# Patient Record
Sex: MALE | Race: WHITE | NOT HISPANIC OR LATINO | Employment: FULL TIME | URBAN - METROPOLITAN AREA
[De-identification: names, ages, dates, MRNs, and addresses within clinical notes are randomized per-mention and may not be internally consistent; named-entity substitution may affect disease eponyms.]

---

## 2017-08-15 ENCOUNTER — HOSPITAL ENCOUNTER (OUTPATIENT)
Dept: FAMILY MEDICINE CLINIC | Facility: CLINIC | Age: 52
Setting detail: SPECIMEN
Discharge: HOME | End: 2017-08-15
Attending: NURSE PRACTITIONER | Admitting: NURSE PRACTITIONER

## 2017-08-15 LAB
ALBUMIN SERPL-MCNC: 4.4 G/DL (ref 3.5–4.8)
ALBUMIN/GLOB SERPL: 1.4 {RATIO} (ref 1–1.7)
ALP SERPL-CCNC: 86 IU/L (ref 32–91)
ALT SERPL-CCNC: 36 IU/L (ref 17–63)
ANION GAP SERPL CALC-SCNC: 19.6 MMOL/L (ref 10–20)
AST SERPL-CCNC: 27 IU/L (ref 15–41)
BASOPHILS # BLD AUTO: 0 10*3/UL (ref 0–0.2)
BASOPHILS NFR BLD AUTO: 0 % (ref 0–2)
BILIRUB SERPL-MCNC: 0.5 MG/DL (ref 0.3–1.2)
BUN SERPL-MCNC: 12 MG/DL (ref 8–20)
BUN/CREAT SERPL: 15 (ref 6.2–20.3)
CALCIUM SERPL-MCNC: 9.9 MG/DL (ref 8.9–10.3)
CHLORIDE SERPL-SCNC: 102 MMOL/L (ref 101–111)
CHOLEST SERPL-MCNC: 235 MG/DL
CHOLEST/HDLC SERPL: 3.4 {RATIO}
CONV CO2: 23 MMOL/L (ref 22–32)
CONV LDL CHOLESTEROL DIRECT: 139 MG/DL (ref 0–100)
CONV TOTAL PROTEIN: 7.5 G/DL (ref 6.1–7.9)
CREAT UR-MCNC: 0.8 MG/DL (ref 0.7–1.2)
DIFFERENTIAL METHOD BLD: (no result)
EOSINOPHIL # BLD AUTO: 0.2 10*3/UL (ref 0–0.3)
EOSINOPHIL # BLD AUTO: 2 % (ref 0–3)
ERYTHROCYTE [DISTWIDTH] IN BLOOD BY AUTOMATED COUNT: 14 % (ref 11.5–14.5)
GLOBULIN UR ELPH-MCNC: 3.1 G/DL (ref 2.5–3.8)
GLUCOSE SERPL-MCNC: 87 MG/DL (ref 65–99)
HCT VFR BLD AUTO: 50.7 % (ref 40–54)
HDLC SERPL-MCNC: 69 MG/DL
HGB BLD-MCNC: 17.1 G/DL (ref 14–18)
LDLC/HDLC SERPL: 2 {RATIO}
LIPID INTERPRETATION: ABNORMAL
LYMPHOCYTES # BLD AUTO: 2.4 10*3/UL (ref 0.8–4.8)
LYMPHOCYTES NFR BLD AUTO: 23 % (ref 18–42)
MCH RBC QN AUTO: 32.4 PG (ref 26–32)
MCHC RBC AUTO-ENTMCNC: 33.8 G/DL (ref 32–36)
MCV RBC AUTO: 95.8 FL (ref 80–94)
MONOCYTES # BLD AUTO: 1 10*3/UL (ref 0.1–1.3)
MONOCYTES NFR BLD AUTO: 9 % (ref 2–11)
NEUTROPHILS # BLD AUTO: 6.7 10*3/UL (ref 2.3–8.6)
NEUTROPHILS NFR BLD AUTO: 66 % (ref 50–75)
NRBC BLD AUTO-RTO: 0 /100{WBCS}
NRBC/RBC NFR BLD MANUAL: 0 10*3/UL
PLATELET # BLD AUTO: 330 10*3/UL (ref 150–450)
PMV BLD AUTO: 7.4 FL (ref 7.4–10.4)
POTASSIUM SERPL-SCNC: 4.6 MMOL/L (ref 3.6–5.1)
PSA SERPL-MCNC: 2.17 NG/ML (ref 0–4)
RBC # BLD AUTO: 5.29 10*6/UL (ref 4.6–6)
SODIUM SERPL-SCNC: 140 MMOL/L (ref 136–144)
TRIGL SERPL-MCNC: 157 MG/DL
TSH SERPL-ACNC: 3.02 UIU/ML (ref 0.34–5.6)
VLDLC SERPL CALC-MCNC: 26.8 MG/DL
WBC # BLD AUTO: 10.2 10*3/UL (ref 4.5–11.5)

## 2017-09-07 ENCOUNTER — HOSPITAL ENCOUNTER (OUTPATIENT)
Dept: CARDIOLOGY | Facility: HOSPITAL | Age: 52
Discharge: HOME | End: 2017-09-07
Attending: NURSE PRACTITIONER | Admitting: NURSE PRACTITIONER

## 2019-09-18 ENCOUNTER — OFFICE VISIT (OUTPATIENT)
Dept: FAMILY MEDICINE CLINIC | Facility: CLINIC | Age: 54
End: 2019-09-18

## 2019-09-18 VITALS
WEIGHT: 180.4 LBS | HEART RATE: 74 BPM | DIASTOLIC BLOOD PRESSURE: 84 MMHG | OXYGEN SATURATION: 97 % | SYSTOLIC BLOOD PRESSURE: 124 MMHG | HEIGHT: 70 IN | RESPIRATION RATE: 20 BRPM | BODY MASS INDEX: 25.83 KG/M2 | TEMPERATURE: 98.7 F

## 2019-09-18 DIAGNOSIS — H69.83 DYSFUNCTION OF BOTH EUSTACHIAN TUBES: Primary | ICD-10-CM

## 2019-09-18 DIAGNOSIS — I10 ESSENTIAL HYPERTENSION: ICD-10-CM

## 2019-09-18 DIAGNOSIS — I42.0 CARDIOMYOPATHY, DILATED (HCC): ICD-10-CM

## 2019-09-18 DIAGNOSIS — R42 DIZZINESS: ICD-10-CM

## 2019-09-18 DIAGNOSIS — F17.210 CIGARETTE SMOKER: ICD-10-CM

## 2019-09-18 PROBLEM — K22.70 BARRETT'S ESOPHAGUS: Status: ACTIVE | Noted: 2019-01-15

## 2019-09-18 PROCEDURE — 85007 BL SMEAR W/DIFF WBC COUNT: CPT | Performed by: HOSPITALIST

## 2019-09-18 PROCEDURE — 82607 VITAMIN B-12: CPT | Performed by: HOSPITALIST

## 2019-09-18 PROCEDURE — 80053 COMPREHEN METABOLIC PANEL: CPT | Performed by: HOSPITALIST

## 2019-09-18 PROCEDURE — 82746 ASSAY OF FOLIC ACID SERUM: CPT | Performed by: HOSPITALIST

## 2019-09-18 PROCEDURE — 36415 COLL VENOUS BLD VENIPUNCTURE: CPT | Performed by: HOSPITALIST

## 2019-09-18 PROCEDURE — 99213 OFFICE O/P EST LOW 20 MIN: CPT | Performed by: HOSPITALIST

## 2019-09-18 PROCEDURE — 80061 LIPID PANEL: CPT | Performed by: HOSPITALIST

## 2019-09-18 PROCEDURE — 84443 ASSAY THYROID STIM HORMONE: CPT | Performed by: HOSPITALIST

## 2019-09-18 PROCEDURE — 85025 COMPLETE CBC W/AUTO DIFF WBC: CPT | Performed by: HOSPITALIST

## 2019-09-18 RX ORDER — PANTOPRAZOLE SODIUM 40 MG/1
TABLET, DELAYED RELEASE ORAL EVERY 24 HOURS
COMMUNITY
Start: 2019-01-15 | End: 2019-12-20 | Stop reason: SDUPTHER

## 2019-09-18 RX ORDER — HYDROCODONE BITARTRATE AND ACETAMINOPHEN 5; 325 MG/1; MG/1
TABLET ORAL
Refills: 0 | COMMUNITY
Start: 2019-08-19 | End: 2020-12-21

## 2019-09-18 RX ORDER — METOPROLOL SUCCINATE 100 MG/1
TABLET, EXTENDED RELEASE ORAL
COMMUNITY
Start: 2018-12-20 | End: 2019-12-20 | Stop reason: SDUPTHER

## 2019-09-18 RX ORDER — ASPIRIN 81 MG/1
TABLET ORAL
COMMUNITY
Start: 2017-12-28 | End: 2019-12-20

## 2019-09-18 RX ORDER — LISINOPRIL 20 MG/1
TABLET ORAL EVERY 24 HOURS
COMMUNITY
Start: 2018-12-20 | End: 2019-12-20 | Stop reason: SDUPTHER

## 2019-09-18 NOTE — PROGRESS NOTES
"Rooming Tab(CC,VS,Pt Hx,Fall Screen)    Patient Care Team:  Jose Francisco Gamble MD as PCP - General    Chief Complaint   Patient presents with   • Dizziness       Subjective     History was provided by the patient.  Here for multiple episodes of dizziness and spells and all assoc. With position change.    Patient states other than the above problems, they are doing ok overall and has no other significant complaints    I have reviewed and updated his medications, medical/family/social history and problem list during today's office visit.       Problem List Tab  Medications Tab  Synopsis Tab  Chart Review Tab  Care Everywhere Tab  Immunizations Tab  Patient History Tab    Social History     Tobacco Use   • Smoking status: Current Every Day Smoker   Substance Use Topics   • Alcohol use: Yes       Review of Systems   Constitutional: Negative for chills and fever.   Respiratory: Negative for chest tightness and shortness of breath.    Cardiovascular: Negative for chest pain.   Gastrointestinal: Negative for abdominal pain.   Musculoskeletal: Negative for arthralgias and myalgias.   Neurological: Negative for headache.       Objective     Rooming Tab(CC,VS,Pt Hx,Fall Screen)  /84 (BP Location: Right arm, Patient Position: Sitting, Cuff Size: Adult)   Pulse 74   Temp 98.7 °F (37.1 °C) (Oral)   Resp 20   Ht 177.8 cm (70\")   Wt 81.8 kg (180 lb 6.4 oz)   SpO2 97%   BMI 25.88 kg/m²     Wt Readings from Last 4 Encounters:   09/18/19 81.8 kg (180 lb 6.4 oz)   01/15/19 87.1 kg (191 lb 16 oz)   12/20/18 84.8 kg (186 lb 16 oz)   06/28/18 86.2 kg (189 lb 16 oz)       Body mass index is 25.88 kg/m².    Physical Exam   Constitutional: He appears well-developed and well-nourished.   HENT:   Head: Normocephalic.   Both ears with air fluid levels and rt. worse   Cardiovascular: Normal rate and regular rhythm.   No murmur heard.  Pulmonary/Chest: Breath sounds normal. He has no wheezes. He has no rales.   Abdominal: Soft. " He exhibits no distension. There is no tenderness.   Skin: Skin is warm and dry.        Statin Choice Calculator  Data Reviewed:                     Assessment/Plan   Order Review Tab  Health Maintenance Tab  Patient Plan/Order Tab  Diagnoses and all orders for this visit:    1. Dysfunction of both eustachian tubes (Primary)    2. Dizziness  -     CBC & Differential  -     Comprehensive Metabolic Panel  -     Lipid Panel  -     TSH  -     Vitamin B12 & Folate    3. Essential hypertension  -     CBC & Differential  -     Comprehensive Metabolic Panel  -     Lipid Panel  -     TSH  -     Vitamin B12 & Folate    4. Cigarette smoker  -     CBC & Differential  -     Comprehensive Metabolic Panel  -     Lipid Panel  -     TSH  -     Vitamin B12 & Folate    5. Cardiomyopathy, dilated (CMS/HCC)  -     CBC & Differential  -     Comprehensive Metabolic Panel  -     Lipid Panel  -     TSH  -     Vitamin B12 & Folate    Other orders  -     Chlorcyclizine-Pseudoephed (STAHIST AD) 25-60 MG tablet; Take 1 tablet by mouth 3 (Three) Times a Day As Needed (congestion) for up to 10 days.  Dispense: 30 tablet; Refill: 3        They were informed of the diagnosis and treatment plan and directed to f/u for any further problems or concerns.  They were given the opportunity to ask questions related to the visit.  Probably needs repeat echo.  Check labs      Orders Placed This Encounter   Procedures   • Comprehensive Metabolic Panel   • Lipid Panel   • TSH   • Vitamin B12 & Folate   • CBC & Differential     Order Specific Question:   Manual Differential     Answer:   No     Wrapup Tab  Return if symptoms worsen or fail to improve.

## 2019-09-19 LAB
ALBUMIN SERPL-MCNC: 4.5 G/DL (ref 3.5–4.8)
ALBUMIN/GLOB SERPL: 1.8 G/DL (ref 1–1.7)
ALP SERPL-CCNC: 89 U/L (ref 32–91)
ALT SERPL W P-5'-P-CCNC: 27 U/L (ref 17–63)
ANION GAP SERPL CALCULATED.3IONS-SCNC: 14.6 MMOL/L (ref 5–15)
ARTICHOKE IGE QN: 138 MG/DL (ref 0–100)
AST SERPL-CCNC: 20 U/L (ref 15–41)
BILIRUB SERPL-MCNC: 0.6 MG/DL (ref 0.3–1.2)
BUN BLD-MCNC: 13 MG/DL (ref 8–20)
BUN/CREAT SERPL: 16.3 (ref 6.2–20.3)
CALCIUM SPEC-SCNC: 9.6 MG/DL (ref 8.9–10.3)
CHLORIDE SERPL-SCNC: 103 MMOL/L (ref 101–111)
CHOLEST SERPL-MCNC: 218 MG/DL
CO2 SERPL-SCNC: 27 MMOL/L (ref 22–32)
CREAT BLD-MCNC: 0.8 MG/DL (ref 0.7–1.2)
DEPRECATED RDW RBC AUTO: 46.8 FL (ref 37–54)
EOSINOPHIL # BLD MANUAL: 0.46 10*3/MM3 (ref 0–0.4)
EOSINOPHIL NFR BLD MANUAL: 4 % (ref 0.3–6.2)
ERYTHROCYTE [DISTWIDTH] IN BLOOD BY AUTOMATED COUNT: 13.8 % (ref 12.3–15.4)
FOLATE SERPL-MCNC: 12.3 NG/ML (ref 5.9–24.8)
GFR SERPL CREATININE-BSD FRML MDRD: 101 ML/MIN/1.73
GLOBULIN UR ELPH-MCNC: 2.5 GM/DL (ref 2.5–3.8)
GLUCOSE BLD-MCNC: 97 MG/DL (ref 65–99)
HCT VFR BLD AUTO: 48.3 % (ref 37.5–51)
HDLC SERPL QL: 4.19
HDLC SERPL-MCNC: 52 MG/DL
HGB BLD-MCNC: 16.6 G/DL (ref 13–17.7)
LDLC/HDLC SERPL: 2.05 {RATIO}
LYMPHOCYTES # BLD MANUAL: 2.85 10*3/MM3 (ref 0.7–3.1)
LYMPHOCYTES NFR BLD MANUAL: 25 % (ref 19.6–45.3)
LYMPHOCYTES NFR BLD MANUAL: 6 % (ref 5–12)
MCH RBC QN AUTO: 33.4 PG (ref 26.6–33)
MCHC RBC AUTO-ENTMCNC: 34.3 G/DL (ref 31.5–35.7)
MCV RBC AUTO: 97.3 FL (ref 79–97)
METAMYELOCYTES NFR BLD MANUAL: 2 % (ref 0–0)
MONOCYTES # BLD AUTO: 0.68 10*3/MM3 (ref 0.1–0.9)
NEUTROPHILS # BLD AUTO: 7.18 10*3/MM3 (ref 1.7–7)
NEUTROPHILS NFR BLD MANUAL: 59 % (ref 42.7–76)
NEUTS BAND NFR BLD MANUAL: 4 % (ref 0–5)
PLAT MORPH BLD: NORMAL
PLATELET # BLD AUTO: 352 10*3/MM3 (ref 140–450)
PMV BLD AUTO: 7.5 FL (ref 6–12)
POTASSIUM BLD-SCNC: 5.6 MMOL/L (ref 3.6–5.1)
PROT SERPL-MCNC: 7 G/DL (ref 6.1–7.9)
RBC # BLD AUTO: 4.97 10*6/MM3 (ref 4.14–5.8)
RBC MORPH BLD: NORMAL
SCAN SLIDE: NORMAL
SODIUM BLD-SCNC: 139 MMOL/L (ref 136–144)
TRIGL SERPL-MCNC: 296 MG/DL
TSH SERPL DL<=0.05 MIU/L-ACNC: 3.32 UIU/ML (ref 0.34–5.6)
VIT B12 BLD-MCNC: 219 PG/ML (ref 180–914)
VLDLC SERPL-MCNC: 59.2 MG/DL
WBC MORPH BLD: NORMAL
WBC NRBC COR # BLD: 11.4 10*3/MM3 (ref 3.4–10.8)

## 2019-12-20 ENCOUNTER — OFFICE VISIT (OUTPATIENT)
Dept: CARDIOLOGY | Facility: CLINIC | Age: 54
End: 2019-12-20

## 2019-12-20 VITALS
HEIGHT: 70 IN | BODY MASS INDEX: 27.2 KG/M2 | DIASTOLIC BLOOD PRESSURE: 64 MMHG | HEART RATE: 80 BPM | OXYGEN SATURATION: 99 % | SYSTOLIC BLOOD PRESSURE: 118 MMHG | WEIGHT: 190 LBS

## 2019-12-20 DIAGNOSIS — I42.0 CARDIOMYOPATHY, DILATED (HCC): Primary | ICD-10-CM

## 2019-12-20 DIAGNOSIS — I10 ESSENTIAL HYPERTENSION: ICD-10-CM

## 2019-12-20 DIAGNOSIS — F17.210 CIGARETTE SMOKER: ICD-10-CM

## 2019-12-20 PROCEDURE — 99213 OFFICE O/P EST LOW 20 MIN: CPT | Performed by: INTERNAL MEDICINE

## 2019-12-20 PROCEDURE — 93000 ELECTROCARDIOGRAM COMPLETE: CPT | Performed by: INTERNAL MEDICINE

## 2019-12-20 RX ORDER — PANTOPRAZOLE SODIUM 40 MG/1
40 TABLET, DELAYED RELEASE ORAL DAILY
Qty: 90 TABLET | Refills: 4 | Status: SHIPPED | OUTPATIENT
Start: 2019-12-20 | End: 2020-12-21

## 2019-12-20 RX ORDER — METOPROLOL SUCCINATE 100 MG/1
100 TABLET, EXTENDED RELEASE ORAL DAILY
Qty: 90 TABLET | Refills: 4 | Status: SHIPPED | OUTPATIENT
Start: 2019-12-20 | End: 2020-12-21 | Stop reason: SDUPTHER

## 2019-12-20 RX ORDER — LISINOPRIL 20 MG/1
20 TABLET ORAL EVERY 24 HOURS
Qty: 90 TABLET | Refills: 4 | Status: SHIPPED | OUTPATIENT
Start: 2019-12-20 | End: 2020-12-21 | Stop reason: SDUPTHER

## 2019-12-20 NOTE — PROGRESS NOTES
Cardiology Office Visit Note      Referring physician:      Reason For Followup: annual FU    HPI:  Titus Jarvis is a 54 y.o. male  presents FU history of hypertensive heart disease and dilated cardiomyopathy. EF 45%.  Continues tobacco abuse,  1/2 pk/day.    Remains active.  Reports recent episode of coughing and sinus congestion with significant a.m. bronchitic smokers type cough but no wheezes reported.  He is also concerned that this could [id number] adverse reaction of dry cough caused by Lisinopril.  He remains very functionally active, works full-time for construction company.    He presents back today with no new cardiac complaints.  He denies  chest pain,  PND, orthopnea, palpitations, near syncope, lower extremity edema or feelings of his heart   racing.  He has been compliant with his medications.       Past Medical History:   Diagnosis Date   • Cardiomyopathy, dilated (CMS/HCC) 9/18/2019   • Essential hypertension 9/18/2019       History reviewed. No pertinent surgical history.      Current Outpatient Medications:   •  HYDROcodone-acetaminophen (NORCO) 5-325 MG per tablet, TK 1 T PO Q 4 TO 6 HOURS, Disp: , Rfl: 0  •  lisinopril (PRINIVIL,ZESTRIL) 20 MG tablet, Take 1 tablet by mouth Daily., Disp: 90 tablet, Rfl: 4  •  metoprolol succinate XL (TOPROL-XL) 100 MG 24 hr tablet, Take 1 tablet by mouth Daily., Disp: 90 tablet, Rfl: 4  •  pantoprazole (PROTONIX) 40 MG EC tablet, Take 1 tablet by mouth Daily., Disp: 90 tablet, Rfl: 4    Social History     Socioeconomic History   • Marital status: Single     Spouse name: Not on file   • Number of children: Not on file   • Years of education: Not on file   • Highest education level: Not on file   Tobacco Use   • Smoking status: Current Every Day Smoker   Substance and Sexual Activity   • Alcohol use: Yes   • Drug use: Defer   • Sexual activity: Defer       No family history on file.      Review of Systems   General: denies fever, chills, anorexia, weight  "loss  Eyes: denies blurring, diplopia  Ear/Nose/Throat: denies ear pain, nosebleeds, hoarseness  Cardiovascular: See HPI  Respiratory: denies excessive sputum, hemoptysis, wheezing  Gastrointestinal: denies nausea, vomiting, change in bowel habits, abdominal pain  Genitourinary: denies dysuria and hematuria  Musculoskeletal: denies back pain, joint pain, joint swelling, muscle cramps, weakness  Skin: denies rashes, itching, suspicious lesions  Neurologic: denies focal neuro deficits  Psychiatric: denies depression, anxiety  Endocrine: denies cold intolerance, heat intolerance  Hematologic/Lymphatic: denies abnormal bruising, bleeding  Allergic/Immunologic: denies urticaria or persistent infections      Objective     Visit Vitals  /64   Pulse 80   Ht 177.8 cm (70\")   Wt 86.2 kg (190 lb)   SpO2 99% Comment: room air   BMI 27.26 kg/m²           Physical Exam  General:     Obese, well developed,, in no acute distress.    Head:     normocephalic and atraumatic.    Eyes:    PERRL/EOM intact, conjunctiva and sclera clear with out nystagmus.    Neck:    no jvd or bruits  Chest Wall:    no deformities   Lungs:    clear bilaterally to auscultation with adequate global airflow   Heart:    non-displaced PMI; regular rate and rhythm, normal S1, S2 without murmurs, rubs, or gallops  Abdomen:  Soft, nontender without HSM  Msk:    no deformity; adequate R OM  Pulses:    pulses normal in all 4 extremities.    Extremities:    no clubbing, cyanosis, edema   Neurologic:    no focal sensory or motor deficits  Skin:    intact without lesions or rashes.    Psych:    alert and cooperative; normal mood and affect; normal attention span and concentration.            ECG 12 Lead  Date/Time: 12/20/2019 9:21 AM  Performed by: DRE Kennedy MD  Authorized by: DRE Kennedy MD   Comparison: compared with previous ECG from 12/20/2018  Similar to previous ECG  Comments: Sinus rhythm with poor initial anterior forces cannot exclude " anteroseptal infarction versus positional variant.  No change from previous tracing              Assessment:   1. Cardiomyopathy, dilated (CMS/HCC); last LVEF assessment 45%.  -Appears hemodynamically stable well compensated and symptom-free    2. Essential hypertension  Is generally well regulated though today is well controlled on lisinopril 20 mg daily and metoprolol and lisinopril    3. Cigarette smoker  Continues smoking habit approxi-1/2 pack/cigarettes/day        Plan:  Continue current medications as he appears stable, well compensated and relatively asymptomatic.  Naturally, strongly advised against all tobacco products  Return to clinic 1 year or sooner if needed.    DRE Kennedy MD  12/20/2019 2:29 PM

## 2020-04-06 ENCOUNTER — TELEPHONE (OUTPATIENT)
Dept: FAMILY MEDICINE CLINIC | Facility: CLINIC | Age: 55
End: 2020-04-06

## 2020-04-06 RX ORDER — COLCHICINE 0.6 MG/1
TABLET ORAL
Qty: 3 TABLET | Refills: 0 | Status: SHIPPED | OUTPATIENT
Start: 2020-04-06 | End: 2020-12-21

## 2020-04-06 RX ORDER — INDOMETHACIN 50 MG/1
50 CAPSULE ORAL 3 TIMES DAILY PRN
Qty: 30 CAPSULE | Refills: 1 | Status: SHIPPED | OUTPATIENT
Start: 2020-04-06 | End: 2020-12-21

## 2020-04-06 NOTE — TELEPHONE ENCOUNTER
PT CALLED STATING HE HAS GOUT IN HIS LEF FOOT IN THE SECOND TOE AND IS HAVING PAIN. PT WOULD LIKE A PRESCRIPTION BE CALLED IN FOR HIM.    STEPHAN CONFIRMED     PT CALL BACK   [phone]

## 2020-12-09 ENCOUNTER — TELEPHONE (OUTPATIENT)
Dept: FAMILY MEDICINE CLINIC | Facility: CLINIC | Age: 55
End: 2020-12-09

## 2020-12-09 NOTE — TELEPHONE ENCOUNTER
PATIENT WAS A PATIENT OF DR. REYNOLDS AND IS WANTING TO SCHEDULE A NEW PATIENT APPOINTMENT WITH BRIGIDA QUEZADA  OR KOURTNEY DELGADO FOR HIS DOT PHYSICAL    PATIENT WANTED  TO MAKE SURE  THAT HIS DOT PHYSICAL DID NOT HAVE TO BE WITH A  DOCTOR AND THAT THE [facility] COULD DO IT       PATIENT IS WANTING TO MAKE A APPOINTMENT FOR January    PLEASE CONTACT @[phone]

## 2020-12-21 ENCOUNTER — OFFICE VISIT (OUTPATIENT)
Dept: CARDIOLOGY | Facility: CLINIC | Age: 55
End: 2020-12-21

## 2020-12-21 VITALS
SYSTOLIC BLOOD PRESSURE: 124 MMHG | DIASTOLIC BLOOD PRESSURE: 80 MMHG | HEIGHT: 70 IN | WEIGHT: 199 LBS | BODY MASS INDEX: 28.49 KG/M2 | HEART RATE: 85 BPM | OXYGEN SATURATION: 99 %

## 2020-12-21 DIAGNOSIS — I10 ESSENTIAL HYPERTENSION: ICD-10-CM

## 2020-12-21 DIAGNOSIS — I42.0 CARDIOMYOPATHY, DILATED (HCC): Primary | ICD-10-CM

## 2020-12-21 PROCEDURE — 93000 ELECTROCARDIOGRAM COMPLETE: CPT | Performed by: INTERNAL MEDICINE

## 2020-12-21 PROCEDURE — 99213 OFFICE O/P EST LOW 20 MIN: CPT | Performed by: INTERNAL MEDICINE

## 2020-12-21 RX ORDER — METOPROLOL SUCCINATE 100 MG/1
100 TABLET, EXTENDED RELEASE ORAL DAILY
Qty: 90 TABLET | Refills: 4 | Status: SHIPPED | OUTPATIENT
Start: 2020-12-21 | End: 2022-01-12 | Stop reason: SDUPTHER

## 2020-12-21 RX ORDER — LISINOPRIL 20 MG/1
20 TABLET ORAL EVERY 24 HOURS
Qty: 90 TABLET | Refills: 4 | Status: SHIPPED | OUTPATIENT
Start: 2020-12-21 | End: 2022-01-14 | Stop reason: SDUPTHER

## 2020-12-21 NOTE — PROGRESS NOTES
Cardiology Office Visit Note      Referring physician:  Keny SWENSON    Reason For Followup: 1 year follow up    HPI:  Titus Jarvis is a 55 y.o. male presents today for an annual follow up visit. Patient has known history of hypertensive heart disease and dilated cardiomyopathy with an EF 45%. Mr. Jarvis continues tobacco abuse,  1/2 pk/day.    Patient states at home his b/p ranges in the 140's. Patient states decreasing the lisinopril from 40mg -20mg on last visit he feels it has made his b/p elevated . Patient states he has been out of his lisinopril and has not taken it for the last 3 days.    He presents back today with no new cardiac complaints.  He denies  chest pain,  PND, orthopnea, palpitations, near syncope, lower extremity edema or feelings of his heart   racing.    Patient states he works a full time job as an .     He has been compliant with his medications.     Past Medical History:   Diagnosis Date   • Cardiomyopathy, dilated (CMS/HCC) 9/18/2019   • Essential hypertension 9/18/2019       History reviewed. No pertinent surgical history.      Current Outpatient Medications:   •  lisinopril (PRINIVIL,ZESTRIL) 20 MG tablet, Take 1 tablet by mouth Daily., Disp: 90 tablet, Rfl: 4  •  metoprolol succinate XL (TOPROL-XL) 100 MG 24 hr tablet, Take 1 tablet by mouth Daily., Disp: 90 tablet, Rfl: 4    Social History     Socioeconomic History   • Marital status: Single     Spouse name: Not on file   • Number of children: Not on file   • Years of education: Not on file   • Highest education level: Not on file   Tobacco Use   • Smoking status: Current Every Day Smoker   • Smokeless tobacco: Never Used   Substance and Sexual Activity   • Alcohol use: Yes   • Drug use: Defer   • Sexual activity: Defer       History reviewed. No pertinent family history.      Review of Systems   General: denies fever, chills, anorexia, weight loss  Eyes: denies blurring,  "diplopia  Ear/Nose/Throat: denies ear pain, nosebleeds, hoarseness  Cardiovascular: See HPI  Respiratory: denies excessive sputum, hemoptysis, wheezing  Gastrointestinal: denies nausea, vomiting, change in bowel habits, abdominal pain  Genitourinary: Denies hematuria dysuria or nocturia  Musculoskeletal: Minor generalized arthralgia, not functionally limiting  Skin: denies rashes, itching, suspicious lesions  Neurologic: denies focal neuro deficits  Psychiatric: denies depression, anxiety  Endocrine: denies cold intolerance, heat intolerance  Hematologic/Lymphatic: denies abnormal bruising, bleeding  Allergic/Immunologic: denies urticaria or persistent infections      Objective     Visit Vitals  /80   Pulse 85   Ht 177.8 cm (70\")   Wt 90.3 kg (199 lb)   SpO2 99%   BMI 28.55 kg/m²           Physical Exam  General:      Pleasant, well-nourished, well developed,, in no acute distress.    Head:     normocephalic and atraumatic.    Eyes:    PERRL/EOM intact, conjunctiva and sclera clear with out nystagmus.    Neck:    no jvd or bruits  Chest Wall:    no deformities   Lungs:    clear bilaterally to auscultation with adequate global airflow   Heart:    non-displaced PMI; regular rate and rhythm, normal S1, S2 without murmurs, rubs, or gallops  Abdomen:  Soft, nontender without HSM  Msk:    no deformity; adequate R OM  Pulses:    pulses normal in all 4 extremities.    Extremities:    no clubbing, cyanosis, edema   Neurologic:    no focal sensory or motor deficits  Skin:    intact without lesions or rashes.    Psych:    alert and cooperative; normal mood and affect; normal attention span and concentration.            ECG 12 Lead    Date/Time: 12/21/2020 7:11 AM  Performed by: DRE Kennedy MD  Authorized by: DRE Kennedy MD   Comparison: compared with previous ECG from 12/20/2019  Similar to previous ECG  Rhythm: sinus rhythm  Q waves: V1 and V2    Other findings: poor R wave progression    Clinical impression: " abnormal EKG  Comments: Sinus rhythm with poor initial anterior R wave progression; cannot exclude septal infarct age indeterminate.  No change from previous tracing              Assessment:   Problems Addressed this Visit        Cardiovascular and Mediastinum    Cardiomyopathy, dilated (CMS/HCC) - Primary    -Currently well compensated, asymptomatic        Essential hypertension    -Currently well compensated/regulated on current medication listed and reviewed in detail          Diagnoses       Codes Comments    Cardiomyopathy, dilated (CMS/HCC)    -  Primary ICD-10-CM: I42.0  ICD-9-CM: 425.4     Essential hypertension     ICD-10-CM: I10  ICD-9-CM: 401.9             Plan:  Advised continue with current medications as he remains well compensated and relatively asymptomatic with good blood pressure control.  Per usual, strongly advised to discontinue all tobacco products and limit alcohol consumption.  Return to clinic annually or sooner if needed.    DRE Kennedy MD  12/21/2020 21:42 EST    This report was generated using the Dragon voice recognition system.

## 2021-01-04 ENCOUNTER — TRANSCRIBE ORDERS (OUTPATIENT)
Dept: OCCUPATIONAL THERAPY | Facility: CLINIC | Age: 56
End: 2021-01-04

## 2021-01-04 DIAGNOSIS — S86.912S STRAIN OF LEFT KNEE, SEQUELA: Primary | ICD-10-CM

## 2021-01-07 ENCOUNTER — TREATMENT (OUTPATIENT)
Dept: PHYSICAL THERAPY | Facility: CLINIC | Age: 56
End: 2021-01-07

## 2021-01-07 DIAGNOSIS — S86.912S STRAIN OF LEFT KNEE, SEQUELA: Primary | ICD-10-CM

## 2021-01-07 PROCEDURE — 97110 THERAPEUTIC EXERCISES: CPT | Performed by: PHYSICAL THERAPIST

## 2021-01-07 PROCEDURE — 97162 PT EVAL MOD COMPLEX 30 MIN: CPT | Performed by: PHYSICAL THERAPIST

## 2021-01-07 PROCEDURE — 97530 THERAPEUTIC ACTIVITIES: CPT | Performed by: PHYSICAL THERAPIST

## 2021-01-07 NOTE — PROGRESS NOTES
Physical Therapy Initial Evaluation and Plan of Care    Patient Name: Titus Jarvis          :  1965  Referring Physician: Pat Aguilar APRN  Diagnosis: Strain of left knee, sequela [S86.912S]    Date of Evaluation: 2021  ______________________________________________________________________    Subjective Evaluation    History of Present Illness  Date of onset: 10/23/2020  Mechanism of injury: Slipped in mud while carrying tools to truck -  - (L) knee pain       Subjective comment: Torn MMT (L)  - Scope - No problems since until now -   Patient Occupation: Field Tech for [employer]   Precautions and Work Restrictions: See OV note ain  Current pain ratin  At worst pain ratin  Location: Medial (L) knee and anterior knee - Knee feels unstable - Feels like its going to buckle -   Quality: Sharp stabbing / needle-like.  Alleviating factors: Nothing helps - Hasn't tried Anti-inflammatories.  Exacerbated by: Climbing, kneeling, squatting, stairs, walking, difficult to get comfortable; LLE activities.  Progression: worsening    Social Support  Patient lives at: Home with one step to get into the house.    Diagnostic Tests  Abnormal x-ray: Results unknown.    Treatments  Current treatment comments: Told to take OTC anti-inflammatories (not currently); wearing a knee brace he purchased.     Patient Goals  Patient/family treatment goals: Pain alleviation; Mobility, strength, gait to allow ADL's and normal job w/o restrictioins.         ___________________________________________________  Objective          Observations     Additional Knee Observation Details  Tibial varus (B); Mild swelling medial (L) knee; Mildly Antalgic/guarded gait LLE -  Flexed knee posturing (L) -    Tenderness     Additional Tenderness Details  Significantly tender along medial joint line (L) knee and infrapatellar region (L) knee;   Palpable crepitus PF Jt (L) w/ AROM    Active Range of Motion     Additional  Active Range of Motion Details  (L) Knee: 0-120-deg w/ pain medial (L) knee w/ flexion > 120-deg and with HE OP;   (R) Knee 0-130-deg    Strength/Myotome Testing     Left Knee   Flexion: 5  Extension: 5  Quadriceps contraction: fair    Right Knee   Normal strength    Tests     Left Knee   Positive pivot shift.     Additional Tests Details  (+) Thessaly's (medial knee (L):  (+) Hernandez's medial (L) knee)   (+) Step-up ; (+) Squat test (both with medial knee jt pain (L))          See Treatment Flow sheet for Exercises, Manual therapy, and modalities.   FUNCTIONAL ACTIVITIES: X 25 min  · TAPING / BRACING: K-Tape to 1) Unload PF Jt / Patellar Lift; 2) Unload infrapatellar region; 3) 2 0ver-lapping X's to stabilize/unload medial (L) knee  · Jt protection, ADL modification; education on importance of Anti-inflammatories in aiding pain reduction, etc as well as use of ice;     ___________________________________________________  Assessment & Plan     Assessment  Assessment details: (L) knee strain;   R/O probable medial meniscus involvement, etc.     PROBLEMS: Pain; Limited ROM; Abnormal gait; Intolerance to ADL's and normal job duties requiring use of LLE -  PROGNOSIS: Good -     GOALS:   SHORT TERM GOALS: 2 weeks:  1) HEP Initiated; 2) Pain decreased 50% with fewer instances of wanting to give-way:   3) AROM (L) knee Flexion Increased equal to (R) :  4) Improved gait / functional ability LLE w/ taping (L) Knee;     LONG TERM GOALS: 4 weeks (or at time of DISCHARGE): 1) (I) HEP; 2) AROM WFL and pain free; 3) Strength / mobility to be able to perform all ADL's and job-related activities w/o restrictions;       Plan  Planned therapy interventions: flexibility, home exercise program, manual therapy, neuromuscular re-education, soft tissue mobilization, strengthening, stretching and therapeutic activities (Modalities prn; Taping / bracing prn; )  Frequency: 3x week  Duration in weeks: 4  Treatment plan discussed with:  patient      ___________________________________________________  Manual Therapy:         mins  32036;   Therapeutic Exercise:    15     mins  66172;     Neuromuscular Destiny:        mins  40095;   Therapeutic Activity:     25     mins  10647;     Ultrasound:          mins  11624;    Iontophoresis;    20     mins  50427    Dry Needling          mins self-pay   Gait Training:          mins  26287;    EVAL TIME:   20 min    Timed Treatment:   60   mins                Total Treatment:     85   mins    PT SIGNATURE:   Brendon Peralta, PT  DATE TREATMENT INITIATED: 1/7/2021  ___________________________________________________  Initial Certification  Certification Period: 4/7/2021  I certify that the therapy services are furnished while this patient is under my care.  The services outlined above are required by this patient, and will be reviewed every 90 days.     PHYSICIAN: ________________________________  DATE: ______  Pat Aguilar APRN        Please sign and return via fax to [phone].. Thank you, [facility].  ______________________________________________________________________  [address] [place] [address]  Phone: [phone] Fax: [phone]

## 2021-01-08 ENCOUNTER — TREATMENT (OUTPATIENT)
Dept: PHYSICAL THERAPY | Facility: CLINIC | Age: 56
End: 2021-01-08

## 2021-01-08 DIAGNOSIS — S86.912S STRAIN OF LEFT KNEE, SEQUELA: Primary | ICD-10-CM

## 2021-01-08 PROCEDURE — 97530 THERAPEUTIC ACTIVITIES: CPT | Performed by: PHYSICAL THERAPIST

## 2021-01-08 PROCEDURE — 97033 APP MDLTY 1+IONTPHRSIS EA 15: CPT | Performed by: PHYSICAL THERAPIST

## 2021-01-08 PROCEDURE — 97110 THERAPEUTIC EXERCISES: CPT | Performed by: PHYSICAL THERAPIST

## 2021-01-08 NOTE — PROGRESS NOTES
Physical Therapy Daily Progress Note    Patient Name: Titus Jarvis         :  1965  Referring Physician: Rodney Peralta, PT      Subjective   Titus Jarvis reports: feeling much better with taping and started taking acetaminophen yesterday as well - Pain medial knee (L)    Objective   Tender along medial knee joint line (L), but more localized - Improved gait w/ increased WB-ing LLE    See Exercise, Manual, and Modality Logs for complete treatment.     Functional / Therapeutic Activities:  15 min  · TAPING / BRACING: K-Tape to 1) Unload PF Jt / Patellar Lift; 2) Unload infrapatellar region; 3) 2 0ver-lapping X's to stabilize/unload medial (L) knee  · Jt protection, ADL modification; Posture and      Assessment/Plan  (L) knee strain; Improved with decreased pain and improved function - taping and anti-inflammatories helpful -   R/O probable medial meniscus involvement, etc.     Continue per plan of care - To see Provider 2021       _________________________________________________  Manual Therapy:    05     mins  06480;  Therapeutic Exercise:    23     mins  85341;     Neuromuscular Destiny:        mins  46690;    Therapeutic Activity:     15     mins  04927;     Iontophoresis:      20     mins  71165;     Ultrasound:     08     mins  52943;    Electrical Stimulation:         mins  43924 ( );  Dry Needling          mins self-pay    Timed Treatment:   71  mins                  Total Treatment:     80   mins    Brendon Peralta PT  Physical Therapist

## 2021-01-12 ENCOUNTER — TREATMENT (OUTPATIENT)
Dept: PHYSICAL THERAPY | Facility: CLINIC | Age: 56
End: 2021-01-12

## 2021-01-12 DIAGNOSIS — S86.912S STRAIN OF LEFT KNEE, SEQUELA: Primary | ICD-10-CM

## 2021-01-12 PROCEDURE — 97110 THERAPEUTIC EXERCISES: CPT | Performed by: PHYSICAL THERAPIST

## 2021-01-12 PROCEDURE — 97530 THERAPEUTIC ACTIVITIES: CPT | Performed by: PHYSICAL THERAPIST

## 2021-01-12 NOTE — PROGRESS NOTES
------------------------------------------------------------------------------------------------------   MD PROGRESS NOTE    Patient: Titus Jarvis        : 1965  Diagnosis/ICD-10 Code:  Strain of left knee, sequela [S86.912S]  Referring practitioner: WALTER Vasquez  Date of Initial Visit: 2021                  Today's Date: 2021  _________________________________________________________________    Thank you for the referral of Mr. Jarvis to [facility].  Mr. Jarvis has attended 3 PT sessions and their treatment has consisted of: modalities prn, manual therapy, therapeutic exercise, kinesio taping, patient education, and HEP.     Subjective   Titus Jarvis reports: improvement with decreased pain and improved mobility and function - Taping and acetaminophen very helpful - Pain localized medial (L) knee - Able to put more wt on the LLE and less pain with stairs - Pain with squatting, twisting, etc. Notes > 50% improvement -    ___________________________________________________________________  Objective              OBSERVATION: Pt ambulating with increased WB-ing LLE - Less hypertrophy medial (L) knee -               PALPATION: Tenderness isolated to medial joint line (L) knee        AROM: (L) Knee: Flexion WNL:  Extension 0-deg, but sharp pain medial jt w/ HE OP   STRENGTH: WFL,    SPECIAL TESTS: (+) Thessaly's and Hernandez's (medial Knee jt pain); (+) Squat Test;                        Less (+) Step-up Test    ACTIVITY TOLERANCE: Improved, but limited ADL's very limited with normal job that requires climbing, squatting, kneeling, etc.      See Exercise, Manual, and Modality Logs for complete treatment.      Functional / Therapeutic Activities:  X 28 min  · TAPING / BRACING:  K-Tape to 1) Unload PF Jt / Patellar Lift; 2) Unload infrapatellar region; 3) 2 Over-lapping X's to stabilize/unload medial (L) knee-  · SEE EXERCISE FLOW SHEET -   · FUNCTIONAL  ASSESSMENT -   · Jt protection, ADL modification; Posture and    ___________________________________________________________________   Assessment/Plan  (L) knee strain; Improved with decreased pain and improved function - taping and anti-inflammatories helpful -   R/O probable medial meniscus involvement, etc if no better with Physical Therapy..   Mr. Jarvis would benefit from continued Physical Therapy.     P: Recommend continued Physical Therapy to allow a full and safe return to ADL's and normal job duties.     Please advise after your exam.    Thank you again for this referral of Mr. Jarvis to [facility].    PT Signature: ______________________________   Brendon Peralta, PT    _________________________________________________  Manual Therapy:                 mins  43054;  Therapeutic Exercise:    23     mins  60971;     Neuromuscular Destiny:        mins  59311;    Therapeutic Activity:      28     mins  46458;     Iontophoresis:                20     mins  93324;     Ultrasound:                          mins  30555;    Electrical Stimulation:         mins  78656 ( );  Dry Needling                       mins self-pay     Timed Treatment:   71  mins                  Total Treatment:     75   mins      ______________________________________________________________________  [address] [place] [address]  Phone: [phone] Fax: [phone]

## 2021-01-14 ENCOUNTER — TREATMENT (OUTPATIENT)
Dept: PHYSICAL THERAPY | Facility: CLINIC | Age: 56
End: 2021-01-14

## 2021-01-14 DIAGNOSIS — S86.912S STRAIN OF LEFT KNEE, SEQUELA: Primary | ICD-10-CM

## 2021-01-14 PROCEDURE — 97110 THERAPEUTIC EXERCISES: CPT | Performed by: PHYSICAL THERAPIST

## 2021-01-14 PROCEDURE — 97140 MANUAL THERAPY 1/> REGIONS: CPT | Performed by: PHYSICAL THERAPIST

## 2021-01-14 PROCEDURE — 97530 THERAPEUTIC ACTIVITIES: CPT | Performed by: PHYSICAL THERAPIST

## 2021-01-14 NOTE — PROGRESS NOTES
Physical Therapy Daily Progress Note    Patient Name: Titus Jarvis         :  1965  Referring Physician: Pat Aguilar APRN      Subjective   Titus Jarvis reports: increased pain / ache medial knee last night - may have been on his knee too long yesterday - Noted pain below his knee cap last night as well -     Objective   Less tenderness medial jt line (L) knee, more centralized, but more tender proximal / medial tibia / distal MCL region? Tender at distal patellar tendon / tibial tubercle (L) -    See Exercise, Manual, and Modality Logs for complete treatment.     Functional / Therapeutic Activities:  20 min  · TAPING / BRACING: K-Tape to 1) Unload PF Jt / Patellar lift; 2) Decompress Pat tendon / fabricate patellar tendon strap; 3)   · Jt protection, ADL modification; Posture and      Assessment/Plan  (L) knee strain; Improved with decreased pain and improved function - taping and anti-inflammatories helpful -   May require further assessment to r/o probable medial meniscus involvement, etc if no better with Physical Therapy..     Progress strengthening /stabilization /functional activity       _________________________________________________  Manual Therapy:    10     mins  47518;  Therapeutic Exercise:    25     mins  08285;     Neuromuscular Destiny:        mins  30596;    Therapeutic Activity:     15     mins  02614;     Iontophoresis:                20     mins  85035;     Ultrasound:     06     mins  55799;    Electrical Stimulation:         mins  95512 ( );  Dry Needling          mins self-pay    Timed Treatment:   76   mins                  Total Treatment:     90   mins    Brendon Peralta PT  Physical Therapist

## 2021-01-15 ENCOUNTER — TREATMENT (OUTPATIENT)
Dept: PHYSICAL THERAPY | Facility: CLINIC | Age: 56
End: 2021-01-15

## 2021-01-15 DIAGNOSIS — S86.912S STRAIN OF LEFT KNEE, SEQUELA: Primary | ICD-10-CM

## 2021-01-15 PROCEDURE — 97110 THERAPEUTIC EXERCISES: CPT | Performed by: PHYSICAL THERAPIST

## 2021-01-15 PROCEDURE — 97033 APP MDLTY 1+IONTPHRSIS EA 15: CPT | Performed by: PHYSICAL THERAPIST

## 2021-01-15 PROCEDURE — 97530 THERAPEUTIC ACTIVITIES: CPT | Performed by: PHYSICAL THERAPIST

## 2021-01-15 NOTE — PROGRESS NOTES
Physical Therapy Daily Progress Note    Patient Name: Titus Jarvis         :  1965  Referring Physician: Pat Aguilar APRN      Subjective   Titus Jarvis reports: medial > anterior knee pain - Improved since initial eval, but feels like improvement has reach a plateau - Knee feels unsteady - Pain w/ walking, stairs, twisting, squatting -     Objective   Tender medial joint line (L)  Pain medial joint line with full knee ext and with OP (L) knee -     See Exercise, Manual, and Modality Logs for complete treatment.     Functional / Therapeutic Activities:  10 min  · TAPING / BRACING: K-tape to Unload medial knee (L) and PF Jt -  · Jt protection, ADL modification; Posture and      Assessment/Plan  (L) knee strain; Improved with decreased pain and improved function - taping and anti-inflammatories helpful to a point, but persistent medial knee pain continues to limit function and tolerance to ADL's and normal job -   Will probably require further assessment (I.e. MRI, Ortho referral, etc) to r/o probable medial meniscus involvement, etc due to apparent plateau in progress -     Progress strengthening /stabilization /functional activity   To see Provider next week -       _________________________________________________  Manual Therapy:         mins  89433;  Therapeutic Exercise:    25     mins  60551;     Neuromuscular Destiny:        mins  74946;    Therapeutic Activity:     10     mins  29615;     Iontophoresis:                10     mins  81133;     Ultrasound:     08     mins  22550;    Electrical Stimulation:         mins  59245 ( );  Dry Needling          mins self-pay    Timed Treatment:   53   mins                  Total Treatment:     60   mins    Brendon Peralta, PT  Physical Therapist

## 2021-01-19 ENCOUNTER — TREATMENT (OUTPATIENT)
Dept: PHYSICAL THERAPY | Facility: CLINIC | Age: 56
End: 2021-01-19

## 2021-01-19 DIAGNOSIS — S86.912S STRAIN OF LEFT KNEE, SEQUELA: Primary | ICD-10-CM

## 2021-01-19 PROCEDURE — 97530 THERAPEUTIC ACTIVITIES: CPT | Performed by: PHYSICAL THERAPIST

## 2021-01-19 PROCEDURE — 97110 THERAPEUTIC EXERCISES: CPT | Performed by: PHYSICAL THERAPIST

## 2021-01-19 NOTE — PROGRESS NOTES
------------------------------------------------------------------------------------------------------   MD PROGRESS NOTE     Patient: Ttius Jarvis        : 1965  Diagnosis/ICD-10 Code:  Strain of left knee, sequela [S86.912S]  Referring practitioner: WALTER Vasquez  Date of Initial Visit: 2021                  Today's Date: 2021  _________________________________________________________________     Thank you for the referral of Mr. Jarvis to [facility].  Mr. Jarvis has attended 6 PT sessions and their treatment has consisted of: modalities prn, manual therapy, therapeutic exercise, kinesio taping, patient education, and HEP.      Subjective   Titus Jarvis reports: overall improvement with decreased pain and improved mobility and function, but noted increased pain medial (L) knee over weekend w/o knee taping due to skin irritation -  C/O persistent medial (L) knee pain -  Continued pain with full knee extension / HE, stairs,squatting, twisting, etc. Notes plateau in progress -    ___________________________________________________________________  Objective              OBSERVATION: Pt ambulating with increased WB-ing LLE -             PALPATION: Tenderness at medial joint line (L) knee                AROM: (L) Knee: Flexion WFL, but pain with full flexion / OP:  Extension 0-deg, but sharp                 pain medial jt w/ HE OP              STRENGTH: WFL,               SPECIAL TESTS: (+) Thessaly's and Hernandez's (medial Knee jt pain); (+) Squat Test;                         (+) Step-up Test ;  Pain medial knee with valgus stress, but no jt play -               ACTIVITY TOLERANCE: Improved, but limited ADL's very limited with normal job that                requires climbing, squatting, kneeling, etc.       See Exercise, Manual, and Modality Logs for complete treatment.      Functional / Therapeutic Activities:  X 23 min  · TAPING / BRACING:     · FUNCTIONAL ASSESSMENT -   · Jt protection, ADL modification; Posture and    ___________________________________________________________________   Assessment/Plan  (L) knee strain; some improvement intially, but plateau in progress - Persistent medial (L) knee joint pain limiting ADL's and normal job requirements -   R/O probable medial meniscus involvement, etc if no better with Physical Therapy..   Mr. Jarvis would benefit from further assessment (I.e. MRI, Ortho referral, etc) to rule out possible internal derangement (medial meniscus tear, etc.) -      P: Recommend further assessment (I.e. MRI, Ortho referral, etc) to rule out possible internal derangement (medial meniscus tear, etc.).    Please advise after your exam.     Thank you again for this referral of Mr. Jarvis to [facility].     PT Signature: ______________________________   Brendon Kathryn, PT     _________________________________________________  Manual Therapy:                 mins  20323;  Therapeutic Exercise:    23     mins  74259;     Neuromuscular Destiny:        mins  88096;    Therapeutic Activity:      23     mins  86009;     Iontophoresis:                     mins  42346;     Ultrasound:                          mins  11030;    Electrical Stimulation:         mins  59929 ( );  Dry Needling                       mins self-pay     Timed Treatment:   46  mins                  Total Treatment:     50   mins        ______________________________________________________________________  [address] [facility] [address] [place] [address]  Phone: [phone]                 Fax: [phone]

## 2021-01-21 ENCOUNTER — TREATMENT (OUTPATIENT)
Dept: PHYSICAL THERAPY | Facility: CLINIC | Age: 56
End: 2021-01-21

## 2021-01-21 DIAGNOSIS — S86.912S STRAIN OF LEFT KNEE, SEQUELA: Primary | ICD-10-CM

## 2021-01-21 PROCEDURE — 97033 APP MDLTY 1+IONTPHRSIS EA 15: CPT | Performed by: PHYSICAL THERAPIST

## 2021-01-21 PROCEDURE — 97110 THERAPEUTIC EXERCISES: CPT | Performed by: PHYSICAL THERAPIST

## 2021-01-21 PROCEDURE — 97530 THERAPEUTIC ACTIVITIES: CPT | Performed by: PHYSICAL THERAPIST

## 2021-01-21 NOTE — PROGRESS NOTES
Physical Therapy Daily Progress Note    Patient Name: Titus Jarvis         :  1965  Referring Physician: Pat Aguilar APRN      Subjective   Titus Jarvis reports: being referred to Orthopedist, but waiting on authorization  Pain medial (L) knee persistent - increased with squatting, twisting, stairs, etc LLE  Taping  Helpful at lessening pain, but still painful w/ stepping up, etc.      Objective   Tender medial (L) knee jt line -     See Exercise, Manual, and Modality Logs for complete treatment.  (limited WB-ing activities due to pain)     Functional / Therapeutic Activities:  15 min  · TAPING / BRACING: K-tape to 1) Unload PF Jt; 2) Unload infrapatellar region; 3) Unload Medial knee jt x 2 strips w/ over-lapping X strips  · Jt protection, ADL modification; Posture and      Assessment/Plan  (L) Knee strain;  Persistent medial knee joint pain limiting function and normal job duties -   Would benefit from further assessment (I.e. MRI, Ortho referral, etc) -     Progress strengthening /stabilization /functional activity - Awaiting authorization for Ortho referral -        _________________________________________________  Manual Therapy:         mins  84071;  Therapeutic Exercise:    30     mins  39905;     Neuromuscular Destiny:        mins  96526;    Therapeutic Activity:     15     mins  53647;     Iontophoresis:      20     mins  69025;     Ultrasound:          mins  53385;    Electrical Stimulation:         mins  17338 ( );  Dry Needling          mins self-pay    Timed Treatment:   65   mins                  Total Treatment:     70   mins    Brendon Peralta PT  Physical Therapist

## 2021-02-15 ENCOUNTER — LAB (OUTPATIENT)
Dept: LAB | Facility: HOSPITAL | Age: 56
End: 2021-02-15

## 2021-02-15 ENCOUNTER — TRANSCRIBE ORDERS (OUTPATIENT)
Dept: ADMINISTRATIVE | Facility: HOSPITAL | Age: 56
End: 2021-02-15

## 2021-02-15 ENCOUNTER — HOSPITAL ENCOUNTER (OUTPATIENT)
Dept: GENERAL RADIOLOGY | Facility: HOSPITAL | Age: 56
Discharge: HOME | End: 2021-02-15

## 2021-02-15 ENCOUNTER — HOSPITAL ENCOUNTER (OUTPATIENT)
Dept: CARDIOLOGY | Facility: HOSPITAL | Age: 56
Discharge: HOME | End: 2021-02-15

## 2021-02-15 DIAGNOSIS — M25.50 ARTHRALGIA, UNSPECIFIED JOINT: ICD-10-CM

## 2021-02-15 DIAGNOSIS — Z01.818 PRE-OP TESTING: Primary | ICD-10-CM

## 2021-02-15 DIAGNOSIS — Z01.818 PRE-OP TESTING: ICD-10-CM

## 2021-02-15 LAB
ALBUMIN SERPL-MCNC: 4.8 G/DL (ref 3.5–5.2)
ALBUMIN/GLOB SERPL: 1.9 G/DL
ALP SERPL-CCNC: 103 U/L (ref 39–117)
ALT SERPL W P-5'-P-CCNC: 40 U/L (ref 1–41)
ANION GAP SERPL CALCULATED.3IONS-SCNC: 11.4 MMOL/L (ref 5–15)
AST SERPL-CCNC: 27 U/L (ref 1–40)
BASOPHILS # BLD AUTO: 0.05 10*3/MM3 (ref 0–0.2)
BASOPHILS NFR BLD AUTO: 0.5 % (ref 0–1.5)
BILIRUB SERPL-MCNC: 0.3 MG/DL (ref 0–1.2)
BILIRUB UR QL STRIP: NEGATIVE
BUN SERPL-MCNC: 13 MG/DL (ref 6–20)
BUN/CREAT SERPL: 17.1 (ref 7–25)
CALCIUM SPEC-SCNC: 10.3 MG/DL (ref 8.6–10.5)
CHLORIDE SERPL-SCNC: 102 MMOL/L (ref 98–107)
CLARITY UR: CLEAR
CO2 SERPL-SCNC: 29.6 MMOL/L (ref 22–29)
COLOR UR: YELLOW
CREAT SERPL-MCNC: 0.76 MG/DL (ref 0.76–1.27)
DEPRECATED RDW RBC AUTO: 44.4 FL (ref 37–54)
EOSINOPHIL # BLD AUTO: 0.14 10*3/MM3 (ref 0–0.4)
EOSINOPHIL NFR BLD AUTO: 1.5 % (ref 0.3–6.2)
ERYTHROCYTE [DISTWIDTH] IN BLOOD BY AUTOMATED COUNT: 12.9 % (ref 12.3–15.4)
GFR SERPL CREATININE-BSD FRML MDRD: 106 ML/MIN/1.73
GLOBULIN UR ELPH-MCNC: 2.5 GM/DL
GLUCOSE SERPL-MCNC: 77 MG/DL (ref 65–99)
GLUCOSE UR STRIP-MCNC: NEGATIVE MG/DL
HCT VFR BLD AUTO: 51.6 % (ref 37.5–51)
HGB BLD-MCNC: 17.6 G/DL (ref 13–17.7)
HGB UR QL STRIP.AUTO: NEGATIVE
IMM GRANULOCYTES # BLD AUTO: 0.17 10*3/MM3 (ref 0–0.05)
IMM GRANULOCYTES NFR BLD AUTO: 1.8 % (ref 0–0.5)
INR PPP: 0.98 (ref 0.93–1.1)
KETONES UR QL STRIP: NEGATIVE
LEUKOCYTE ESTERASE UR QL STRIP.AUTO: NEGATIVE
LYMPHOCYTES # BLD AUTO: 2.54 10*3/MM3 (ref 0.7–3.1)
LYMPHOCYTES NFR BLD AUTO: 27.4 % (ref 19.6–45.3)
MCH RBC QN AUTO: 32.3 PG (ref 26.6–33)
MCHC RBC AUTO-ENTMCNC: 34.1 G/DL (ref 31.5–35.7)
MCV RBC AUTO: 94.7 FL (ref 79–97)
MONOCYTES # BLD AUTO: 1.02 10*3/MM3 (ref 0.1–0.9)
MONOCYTES NFR BLD AUTO: 11 % (ref 5–12)
NEUTROPHILS NFR BLD AUTO: 5.35 10*3/MM3 (ref 1.7–7)
NEUTROPHILS NFR BLD AUTO: 57.8 % (ref 42.7–76)
NITRITE UR QL STRIP: NEGATIVE
NRBC BLD AUTO-RTO: 0 /100 WBC (ref 0–0.2)
PH UR STRIP.AUTO: 5.5 [PH] (ref 5–8)
PLATELET # BLD AUTO: 363 10*3/MM3 (ref 140–450)
PMV BLD AUTO: 9.4 FL (ref 6–12)
POTASSIUM SERPL-SCNC: 4.6 MMOL/L (ref 3.5–5.2)
PROT SERPL-MCNC: 7.3 G/DL (ref 6–8.5)
PROT UR QL STRIP: NEGATIVE
PROTHROMBIN TIME: 10.8 SECONDS (ref 9.6–11.7)
RBC # BLD AUTO: 5.45 10*6/MM3 (ref 4.14–5.8)
SODIUM SERPL-SCNC: 143 MMOL/L (ref 136–145)
SP GR UR STRIP: 1.03 (ref 1–1.03)
UROBILINOGEN UR QL STRIP: NORMAL
WBC # BLD AUTO: 9.27 10*3/MM3 (ref 3.4–10.8)

## 2021-02-15 PROCEDURE — 93010 ELECTROCARDIOGRAM REPORT: CPT | Performed by: INTERNAL MEDICINE

## 2021-02-15 PROCEDURE — 80053 COMPREHEN METABOLIC PANEL: CPT

## 2021-02-15 PROCEDURE — 93005 ELECTROCARDIOGRAM TRACING: CPT

## 2021-02-15 PROCEDURE — 85610 PROTHROMBIN TIME: CPT

## 2021-02-15 PROCEDURE — 85025 COMPLETE CBC W/AUTO DIFF WBC: CPT

## 2021-02-15 PROCEDURE — 36415 COLL VENOUS BLD VENIPUNCTURE: CPT

## 2021-02-15 PROCEDURE — 81003 URINALYSIS AUTO W/O SCOPE: CPT

## 2021-02-15 PROCEDURE — 71046 X-RAY EXAM CHEST 2 VIEWS: CPT

## 2021-03-02 LAB — QT INTERVAL: 363 MS

## 2021-06-24 ENCOUNTER — HOSPITAL ENCOUNTER (EMERGENCY)
Facility: HOSPITAL | Age: 56
Discharge: HOME | End: 2021-06-24

## 2021-06-24 PROCEDURE — 99211 OFF/OP EST MAY X REQ PHY/QHP: CPT

## 2021-06-25 ENCOUNTER — APPOINTMENT (OUTPATIENT)
Dept: CT IMAGING | Facility: HOSPITAL | Age: 56
End: 2021-06-25

## 2021-06-25 ENCOUNTER — HOSPITAL ENCOUNTER (EMERGENCY)
Facility: HOSPITAL | Age: 56
Discharge: HOME | End: 2021-06-25
Admitting: EMERGENCY MEDICINE

## 2021-06-25 VITALS
OXYGEN SATURATION: 92 % | WEIGHT: 192.46 LBS | TEMPERATURE: 98 F | HEART RATE: 76 BPM | HEIGHT: 70 IN | DIASTOLIC BLOOD PRESSURE: 61 MMHG | BODY MASS INDEX: 27.55 KG/M2 | RESPIRATION RATE: 18 BRPM | SYSTOLIC BLOOD PRESSURE: 106 MMHG

## 2021-06-25 DIAGNOSIS — R10.9 ABDOMINAL PAIN, UNSPECIFIED ABDOMINAL LOCATION: ICD-10-CM

## 2021-06-25 DIAGNOSIS — K57.92 DIVERTICULITIS: Primary | ICD-10-CM

## 2021-06-25 LAB
ALBUMIN SERPL-MCNC: 4.3 G/DL (ref 3.5–5.2)
ALBUMIN/GLOB SERPL: 1.5 G/DL
ALP SERPL-CCNC: 129 U/L (ref 39–117)
ALT SERPL W P-5'-P-CCNC: 38 U/L (ref 1–41)
ANION GAP SERPL CALCULATED.3IONS-SCNC: 11 MMOL/L (ref 5–15)
AST SERPL-CCNC: 21 U/L (ref 1–40)
BASOPHILS # BLD AUTO: 0 10*3/MM3 (ref 0–0.2)
BASOPHILS NFR BLD AUTO: 0.4 % (ref 0–1.5)
BILIRUB SERPL-MCNC: 0.3 MG/DL (ref 0–1.2)
BILIRUB UR QL STRIP: NEGATIVE
BUN SERPL-MCNC: 9 MG/DL (ref 6–20)
BUN/CREAT SERPL: 11.8 (ref 7–25)
CALCIUM SPEC-SCNC: 9 MG/DL (ref 8.6–10.5)
CHLORIDE SERPL-SCNC: 102 MMOL/L (ref 98–107)
CLARITY UR: CLEAR
CO2 SERPL-SCNC: 26 MMOL/L (ref 22–29)
COLOR UR: YELLOW
CREAT SERPL-MCNC: 0.76 MG/DL (ref 0.76–1.27)
DEPRECATED RDW RBC AUTO: 48.6 FL (ref 37–54)
EOSINOPHIL # BLD AUTO: 0.1 10*3/MM3 (ref 0–0.4)
EOSINOPHIL NFR BLD AUTO: 1.2 % (ref 0.3–6.2)
ERYTHROCYTE [DISTWIDTH] IN BLOOD BY AUTOMATED COUNT: 14.5 % (ref 12.3–15.4)
GFR SERPL CREATININE-BSD FRML MDRD: 106 ML/MIN/1.73
GLOBULIN UR ELPH-MCNC: 2.8 GM/DL
GLUCOSE SERPL-MCNC: 190 MG/DL (ref 65–99)
GLUCOSE UR STRIP-MCNC: NEGATIVE MG/DL
HCT VFR BLD AUTO: 51.4 % (ref 37.5–51)
HGB BLD-MCNC: 17.4 G/DL (ref 13–17.7)
HGB UR QL STRIP.AUTO: NEGATIVE
KETONES UR QL STRIP: NEGATIVE
LEUKOCYTE ESTERASE UR QL STRIP.AUTO: NEGATIVE
LYMPHOCYTES # BLD AUTO: 2.4 10*3/MM3 (ref 0.7–3.1)
LYMPHOCYTES NFR BLD AUTO: 24.3 % (ref 19.6–45.3)
MCH RBC QN AUTO: 32.4 PG (ref 26.6–33)
MCHC RBC AUTO-ENTMCNC: 33.9 G/DL (ref 31.5–35.7)
MCV RBC AUTO: 95.6 FL (ref 79–97)
MONOCYTES # BLD AUTO: 0.9 10*3/MM3 (ref 0.1–0.9)
MONOCYTES NFR BLD AUTO: 9.6 % (ref 5–12)
NEUTROPHILS NFR BLD AUTO: 6.4 10*3/MM3 (ref 1.7–7)
NEUTROPHILS NFR BLD AUTO: 64.5 % (ref 42.7–76)
NITRITE UR QL STRIP: NEGATIVE
NRBC BLD AUTO-RTO: 0 /100 WBC (ref 0–0.2)
PH UR STRIP.AUTO: 5.5 [PH] (ref 5–8)
PLATELET # BLD AUTO: 329 10*3/MM3 (ref 140–450)
PMV BLD AUTO: 6.9 FL (ref 6–12)
POTASSIUM SERPL-SCNC: 3.8 MMOL/L (ref 3.5–5.2)
PROT SERPL-MCNC: 7.1 G/DL (ref 6–8.5)
PROT UR QL STRIP: NEGATIVE
RBC # BLD AUTO: 5.38 10*6/MM3 (ref 4.14–5.8)
SODIUM SERPL-SCNC: 139 MMOL/L (ref 136–145)
SP GR UR STRIP: 1.03 (ref 1–1.03)
UROBILINOGEN UR QL STRIP: NORMAL
WBC # BLD AUTO: 9.9 10*3/MM3 (ref 3.4–10.8)
WHOLE BLOOD HOLD SPECIMEN: NORMAL

## 2021-06-25 PROCEDURE — 85025 COMPLETE CBC W/AUTO DIFF WBC: CPT | Performed by: PHYSICIAN ASSISTANT

## 2021-06-25 PROCEDURE — 80053 COMPREHEN METABOLIC PANEL: CPT | Performed by: PHYSICIAN ASSISTANT

## 2021-06-25 PROCEDURE — 0 IOPAMIDOL PER 1 ML: Performed by: PHYSICIAN ASSISTANT

## 2021-06-25 PROCEDURE — 81003 URINALYSIS AUTO W/O SCOPE: CPT | Performed by: PHYSICIAN ASSISTANT

## 2021-06-25 PROCEDURE — 74177 CT ABD & PELVIS W/CONTRAST: CPT

## 2021-06-25 PROCEDURE — 99283 EMERGENCY DEPT VISIT LOW MDM: CPT

## 2021-06-25 RX ORDER — ONDANSETRON 4 MG/1
4 TABLET, ORALLY DISINTEGRATING ORAL EVERY 8 HOURS PRN
Qty: 20 TABLET | Refills: 0 | Status: SHIPPED | OUTPATIENT
Start: 2021-06-25 | End: 2021-12-20

## 2021-06-25 RX ORDER — AMOXICILLIN AND CLAVULANATE POTASSIUM 875; 125 MG/1; MG/1
1 TABLET, FILM COATED ORAL 3 TIMES DAILY
Qty: 21 TABLET | Refills: 0 | Status: SHIPPED | OUTPATIENT
Start: 2021-06-25 | End: 2021-07-02

## 2021-06-25 RX ORDER — SODIUM CHLORIDE 0.9 % (FLUSH) 0.9 %
10 SYRINGE (ML) INJECTION AS NEEDED
Status: DISCONTINUED | OUTPATIENT
Start: 2021-06-25 | End: 2021-06-25 | Stop reason: HOSPADM

## 2021-06-25 RX ADMIN — IOPAMIDOL 100 ML: 755 INJECTION, SOLUTION INTRAVENOUS at 10:09

## 2021-12-20 ENCOUNTER — OFFICE VISIT (OUTPATIENT)
Dept: CARDIOLOGY | Facility: CLINIC | Age: 56
End: 2021-12-20

## 2021-12-20 VITALS
BODY MASS INDEX: 29.01 KG/M2 | HEART RATE: 81 BPM | WEIGHT: 202.6 LBS | SYSTOLIC BLOOD PRESSURE: 118 MMHG | HEIGHT: 70 IN | DIASTOLIC BLOOD PRESSURE: 80 MMHG | RESPIRATION RATE: 18 BRPM

## 2021-12-20 DIAGNOSIS — Z13.1 SCREENING FOR DIABETES MELLITUS: ICD-10-CM

## 2021-12-20 DIAGNOSIS — Z13.220 SCREENING FOR CHOLESTEROL LEVEL: ICD-10-CM

## 2021-12-20 DIAGNOSIS — F17.210 CIGARETTE SMOKER: ICD-10-CM

## 2021-12-20 DIAGNOSIS — R06.09 DOE (DYSPNEA ON EXERTION): ICD-10-CM

## 2021-12-20 DIAGNOSIS — R07.89 CHEST DISCOMFORT: Primary | ICD-10-CM

## 2021-12-20 PROCEDURE — 99214 OFFICE O/P EST MOD 30 MIN: CPT | Performed by: INTERNAL MEDICINE

## 2021-12-20 RX ORDER — PANTOPRAZOLE SODIUM 40 MG/1
40 TABLET, DELAYED RELEASE ORAL DAILY
COMMUNITY
Start: 2021-10-13

## 2021-12-20 NOTE — PROGRESS NOTES
"Cardiology Clinic Note  Alpesh Rock MD, PhD    Subjective:     Encounter Date:12/20/2021      Patient ID: Titus Jarvis is a 56 y.o. male.    Chief Complaint:  Chief Complaint   Patient presents with   • Follow-up       HPI:  I the pleasure to see this very pleasant 56-year-old gentleman is a new patient today.  He has a cardiac history of cardiomyopathy historically with a EF of 45% with dilated cardiomyopathy with nonobstructive CAD.  Continues to smoke was counseled to quit.  Blood pressure 1/31/1940 systolic, he is on stable medical therapy but says he been feeling worse with much worsening shortness of breath.  On my exam I do hear wheezing as well as bronchial breath sounds likely concerning for advanced COPD or emphysema.  He has no lower extremity edema no near syncope or palpitations but says his windows very short he is short of breath with even minimal activity.  No other palpitations PND orthopnea or other complaints no recent fevers chill sweats nausea vomiting or diarrhea, he denies any chest pain but says he does get some heaviness occasional.    Historical data copied forward from previous encounters in EMR including the history, exam, and assessment/plan has been reviewed and is unchanged unless noted otherwise.          Cardiac medicines reviewed with risk, benefits, and necessity of each discussed.    Risk and benefit of cardiac testing reviewed including death heart attack stroke pain bleeding infection need for vascular /cardiovascular surgery were discussed and the patient     Objective:         /80 (BP Location: Left arm, Patient Position: Sitting)   Pulse 81   Resp 18   Ht 177.8 cm (70\")   Wt 91.9 kg (202 lb 9.6 oz)   BMI 29.07 kg/m²     Physical Exam  Positive wheezing, bronchial breath sounds  Regular rate and rhythm no rubs gallops heaves left  No peripheral edema  No carotid bruits or JVD  Pulses 2+  Warm and dry  Assessment:         Diagnoses and all orders for this " visit:    1. Chest discomfort (Primary)  -     CT Chest With & Without Contrast; Future  -     Adult Transthoracic Echo Complete W/ Cont if Necessary Per Protocol; Future  -     Stress Test With Myocardial Perfusion One Day; Future  -     Comprehensive Metabolic Panel; Future  -     CBC & Differential; Future  -     TSH; Future  -     Hemoglobin A1c; Future  -     Lipid Panel; Future  -     Urinalysis With Microscopic - Urine, Clean Catch; Future  -     Sedimentation Rate; Future  -     C-reactive Protein; Future    2. HUNT (dyspnea on exertion)  -     CT Chest With & Without Contrast; Future  -     Adult Transthoracic Echo Complete W/ Cont if Necessary Per Protocol; Future  -     Stress Test With Myocardial Perfusion One Day; Future  -     Comprehensive Metabolic Panel; Future  -     CBC & Differential; Future  -     TSH; Future  -     Hemoglobin A1c; Future  -     Lipid Panel; Future  -     Urinalysis With Microscopic - Urine, Clean Catch; Future  -     Sedimentation Rate; Future  -     C-reactive Protein; Future    3. Cigarette smoker  -     CT Chest With & Without Contrast; Future    4. Screening for cholesterol level  -     Lipid Panel; Future    5. Screening for diabetes mellitus  -     Hemoglobin A1c; Future           Plan:      2D echo for structural and functional evaluation with underlying cardiomyopathy  Stress test for risk stratification with imaging  We will continue present medicines  No labs in quite some time, CMP CBC magnesium, fasting lipid study, evaluate for any new onset diabetes, get urinalysis, sed rate and C-reactive protein look for inflammatory markers  Longtime smoker, CT scan of the chest as well with abnormal physical exam bronchial breath sounds concerning for COPD and emphysema, rule out cancer      30 days    Further recommendations to follow findings        The pleasure to be involved in this patient's cardiovascular care.  Please call with any questions or concerns  Alpesh Rock,  MD, PhD    Most recent EKG as reviewed and interpreted by me:  Procedures     Most recent echo as reviewed and interpreted by me:      Most recent stress test as reviewed and interpreted by me:      Most recent cardiac catheterization as reviewed interpreted by me:  No results found for this or any previous visit.    The following portions of the patient's history were reviewed and updated as appropriate: allergies, current medications, past family history, past medical history, past social history, past surgical history and problem list.      ROS:  14 point review of systems negative except as mentioned above    Current Outpatient Medications:   •  lisinopril (PRINIVIL,ZESTRIL) 20 MG tablet, Take 1 tablet by mouth Daily., Disp: 90 tablet, Rfl: 4  •  metoprolol succinate XL (TOPROL-XL) 100 MG 24 hr tablet, Take 1 tablet by mouth Daily., Disp: 90 tablet, Rfl: 4  •  pantoprazole (PROTONIX) 40 MG EC tablet, Daily., Disp: , Rfl:     Problem List:  Patient Active Problem List   Diagnosis   • Saeed's esophagus   • Cardiomyopathy, dilated (HCC)   • Cigarette smoker   • Essential hypertension     Past Medical History:  Past Medical History:   Diagnosis Date   • Cardiomyopathy, dilated (HCC) 9/18/2019   • Essential hypertension 9/18/2019     Past Surgical History:  History reviewed. No pertinent surgical history.  Social History:  Social History     Socioeconomic History   • Marital status: Single   Tobacco Use   • Smoking status: Current Every Day Smoker   • Smokeless tobacco: Never Used   Substance and Sexual Activity   • Alcohol use: Yes   • Drug use: Defer   • Sexual activity: Defer     Allergies:  No Known Allergies  Immunizations:  Immunization History   Administered Date(s) Administered   • Flu Vaccine Intradermal Quad 18-64YR 10/05/2017, 09/01/2020            In-Office Procedure(s):  No orders to display        ASCVD RIsk Score::  The 10-year ASCVD risk score (Armida MIHAELA Jr., et al., 2013) is: 11.8%    Values used to  calculate the score:      Age: 56 years      Sex: Male      Is Non- : No      Diabetic: No      Tobacco smoker: Yes      Systolic Blood Pressure: 118 mmHg      Is BP treated: Yes      HDL Cholesterol: 52 mg/dL      Total Cholesterol: 218 mg/dL    Imaging:    Results for orders placed in visit on 02/15/21    XR Chest 2 View    Narrative  Examination: XR CHEST 2 VW-    Date of Exam: 2/15/2021 8:43 AM    Indication: PRE OPS, JOINT PAIN; Z01.818-Encounter for other  preprocedural examination; M25.50-Pain in unspecified joint.  Preop left  knee arthroplasty. Previous smoking history.    Comparison: Radiograph 03/25/2015    Technique: 2 radiographic views of the chest were obtained.    Findings:  There are no airspace consolidations. No pleural effusions. No  pneumothorax. The heart size is normal. The pulmonary vasculature  appears within normal limits. No acute osseous abnormality identified.    Impression  No acute cardiopulmonary process.    Electronically Signed By-Carmelina Riojas MD On:2/15/2021 9:08 AM  This report was finalized on [id number] by  Carmelina Riojas MD.       Results for orders placed during the hospital encounter of 06/25/21    CT Abdomen Pelvis With Contrast    Narrative  CT ABDOMEN PELVIS W CONTRAST-    Date of Exam: 6/25/2021 9:55 AM    Indication: abd pain.    Comparison: March 27, 2015    Technique: CT scan of the abdomen and pelvis was performed after the  uneventful administration of 100 mL IV Isovue 370.  Automated exposure  control and iterative reconstruction methods were used.    FINDINGS:  Within the lung bases is mild right basilar atelectasis.    There is a focus of dystrophic desiccation within the right inferior  hepatic lobe. The gallbladder, left adrenal gland, kidneys, spleen, and  pancreas are unremarkable. A 2.3 cm right adrenal nodule is unchanged,  likely an adenoma.    The stomach appears normal. The small bowel appears normal in caliber  and  configuration. There is sigmoid diverticulosis with surrounding  inflammatory changes compatible with acute diverticulitis. There is no  pneumoperitoneum. No loculated collection is identified to suggest an  abscess. The appendix appears normal. No abnormally enlarged lymph nodes  are identified.    The rectum, prostate, and urinary bladder are unremarkable.    No aggressive osseous lesions are identified.    Impression  1.Acute uncomplicated sigmoid diverticulitis.  2.Stable right adrenal nodule, likely an adenoma.    Electronically Signed By-Alex Treviño MD On:6/25/2021 10:15 AM  This report was finalized on [id number] by  Alex Treviño MD.      Results for orders placed during the hospital encounter of 06/25/21    CT Abdomen Pelvis With Contrast    Narrative  CT ABDOMEN PELVIS W CONTRAST-    Date of Exam: 6/25/2021 9:55 AM    Indication: abd pain.    Comparison: March 27, 2015    Technique: CT scan of the abdomen and pelvis was performed after the  uneventful administration of 100 mL IV Isovue 370.  Automated exposure  control and iterative reconstruction methods were used.    FINDINGS:  Within the lung bases is mild right basilar atelectasis.    There is a focus of dystrophic desiccation within the right inferior  hepatic lobe. The gallbladder, left adrenal gland, kidneys, spleen, and  pancreas are unremarkable. A 2.3 cm right adrenal nodule is unchanged,  likely an adenoma.    The stomach appears normal. The small bowel appears normal in caliber  and configuration. There is sigmoid diverticulosis with surrounding  inflammatory changes compatible with acute diverticulitis. There is no  pneumoperitoneum. No loculated collection is identified to suggest an  abscess. The appendix appears normal. No abnormally enlarged lymph nodes  are identified.    The rectum, prostate, and urinary bladder are unremarkable.    No aggressive osseous lesions are identified.    Impression  1.Acute uncomplicated sigmoid  diverticulitis.  2.Stable right adrenal nodule, likely an adenoma.    Electronically Signed By-Alex Treviño MD On:6/25/2021 10:15 AM  This report was finalized on [id number] by  Alex Treviño MD.      Lab Review:   Admission on 06/25/2021, Discharged on 06/25/2021   Component Date Value   • Extra Tube 06/25/2021 hold for add-on    • Glucose 06/25/2021 190*   • BUN 06/25/2021 9    • Creatinine 06/25/2021 0.76    • Sodium 06/25/2021 139    • Potassium 06/25/2021 3.8    • Chloride 06/25/2021 102    • CO2 06/25/2021 26.0    • Calcium 06/25/2021 9.0    • Total Protein 06/25/2021 7.1    • Albumin 06/25/2021 4.30    • ALT (SGPT) 06/25/2021 38    • AST (SGOT) 06/25/2021 21    • Alkaline Phosphatase 06/25/2021 129*   • Total Bilirubin 06/25/2021 0.3    • eGFR Non  Amer 06/25/2021 106    • Globulin 06/25/2021 2.8    • A/G Ratio 06/25/2021 1.5    • BUN/Creatinine Ratio 06/25/2021 11.8    • Anion Gap 06/25/2021 11.0    • Color, UA 06/25/2021 Yellow    • Appearance, UA 06/25/2021 Clear    • pH, UA 06/25/2021 5.5    • Specific Gravity, UA 06/25/2021 1.028    • Glucose, UA 06/25/2021 Negative    • Ketones, UA 06/25/2021 Negative    • Bilirubin, UA 06/25/2021 Negative    • Blood, UA 06/25/2021 Negative    • Protein, UA 06/25/2021 Negative    • Leuk Esterase, UA 06/25/2021 Negative    • Nitrite, UA 06/25/2021 Negative    • Urobilinogen, UA 06/25/2021 0.2 E.U./dL    • WBC 06/25/2021 9.90    • RBC 06/25/2021 5.38    • Hemoglobin 06/25/2021 17.4    • Hematocrit 06/25/2021 51.4*   • MCV 06/25/2021 95.6    • MCH 06/25/2021 32.4    • MCHC 06/25/2021 33.9    • RDW 06/25/2021 14.5    • RDW-SD 06/25/2021 48.6    • MPV 06/25/2021 6.9    • Platelets 06/25/2021 329    • Neutrophil % 06/25/2021 64.5    • Lymphocyte % 06/25/2021 24.3    • Monocyte % 06/25/2021 9.6    • Eosinophil % 06/25/2021 1.2    • Basophil % 06/25/2021 0.4    • Neutrophils, Absolute 06/25/2021 6.40    • Lymphocytes, Absolute 06/25/2021 2.40    • Monocytes,  Absolute 06/25/2021 0.90    • Eosinophils, Absolute 06/25/2021 0.10    • Basophils, Absolute 06/25/2021 0.00    • nRBC 06/25/2021 0.0      Recent labs reviewed and interpreted for clinical significance and application            Level of Care:           Alpesh Rock MD  12/20/21  .

## 2021-12-30 ENCOUNTER — HOSPITAL ENCOUNTER (OUTPATIENT)
Dept: CARDIOLOGY | Facility: HOSPITAL | Age: 56
Discharge: HOME | End: 2021-12-30

## 2021-12-30 ENCOUNTER — HOSPITAL ENCOUNTER (OUTPATIENT)
Dept: NUCLEAR MEDICINE | Facility: HOSPITAL | Age: 56
Discharge: HOME | End: 2021-12-30

## 2021-12-30 ENCOUNTER — TELEPHONE (OUTPATIENT)
Dept: CARDIOLOGY | Facility: CLINIC | Age: 56
End: 2021-12-30

## 2021-12-30 ENCOUNTER — LAB (OUTPATIENT)
Dept: LAB | Facility: HOSPITAL | Age: 56
End: 2021-12-30

## 2021-12-30 DIAGNOSIS — R07.89 CHEST DISCOMFORT: ICD-10-CM

## 2021-12-30 DIAGNOSIS — R06.09 DOE (DYSPNEA ON EXERTION): ICD-10-CM

## 2021-12-30 DIAGNOSIS — Z13.1 SCREENING FOR DIABETES MELLITUS: ICD-10-CM

## 2021-12-30 DIAGNOSIS — Z13.220 SCREENING FOR CHOLESTEROL LEVEL: ICD-10-CM

## 2021-12-30 LAB
ALBUMIN SERPL-MCNC: 4.9 G/DL (ref 3.5–5.2)
ALBUMIN/GLOB SERPL: 2.2 G/DL
ALP SERPL-CCNC: 96 U/L (ref 39–117)
ALT SERPL W P-5'-P-CCNC: 41 U/L (ref 1–41)
ANION GAP SERPL CALCULATED.3IONS-SCNC: 7.3 MMOL/L (ref 5–15)
AST SERPL-CCNC: 23 U/L (ref 1–40)
BACTERIA UR QL AUTO: NORMAL /HPF
BASOPHILS # BLD AUTO: 0.06 10*3/MM3 (ref 0–0.2)
BASOPHILS NFR BLD AUTO: 0.6 % (ref 0–1.5)
BILIRUB SERPL-MCNC: 0.3 MG/DL (ref 0–1.2)
BILIRUB UR QL STRIP: NEGATIVE
BUN SERPL-MCNC: 9 MG/DL (ref 6–20)
BUN/CREAT SERPL: 11.8 (ref 7–25)
CALCIUM SPEC-SCNC: 9.5 MG/DL (ref 8.6–10.5)
CHLORIDE SERPL-SCNC: 101 MMOL/L (ref 98–107)
CHOLEST SERPL-MCNC: 205 MG/DL (ref 0–200)
CLARITY UR: CLEAR
CO2 SERPL-SCNC: 31.7 MMOL/L (ref 22–29)
COLOR UR: YELLOW
CREAT SERPL-MCNC: 0.76 MG/DL (ref 0.76–1.27)
CRP SERPL-MCNC: 0.38 MG/DL (ref 0–0.5)
DEPRECATED RDW RBC AUTO: 43.5 FL (ref 37–54)
EOSINOPHIL # BLD AUTO: 0.17 10*3/MM3 (ref 0–0.4)
EOSINOPHIL NFR BLD AUTO: 1.7 % (ref 0.3–6.2)
ERYTHROCYTE [DISTWIDTH] IN BLOOD BY AUTOMATED COUNT: 12.7 % (ref 12.3–15.4)
ERYTHROCYTE [SEDIMENTATION RATE] IN BLOOD: 14 MM/HR (ref 0–20)
GFR SERPL CREATININE-BSD FRML MDRD: 106 ML/MIN/1.73
GLOBULIN UR ELPH-MCNC: 2.2 GM/DL
GLUCOSE SERPL-MCNC: 89 MG/DL (ref 65–99)
GLUCOSE UR STRIP-MCNC: NEGATIVE MG/DL
HBA1C MFR BLD: 5.7 % (ref 3.5–5.6)
HCT VFR BLD AUTO: 50.6 % (ref 37.5–51)
HDLC SERPL-MCNC: 50 MG/DL (ref 40–60)
HGB BLD-MCNC: 17.4 G/DL (ref 13–17.7)
HGB UR QL STRIP.AUTO: NEGATIVE
HYALINE CASTS UR QL AUTO: NORMAL /LPF
IMM GRANULOCYTES # BLD AUTO: 0.15 10*3/MM3 (ref 0–0.05)
IMM GRANULOCYTES NFR BLD AUTO: 1.5 % (ref 0–0.5)
KETONES UR QL STRIP: NEGATIVE
LDLC SERPL CALC-MCNC: 123 MG/DL (ref 0–100)
LDLC/HDLC SERPL: 2.37 {RATIO}
LEUKOCYTE ESTERASE UR QL STRIP.AUTO: NEGATIVE
LYMPHOCYTES # BLD AUTO: 2.79 10*3/MM3 (ref 0.7–3.1)
LYMPHOCYTES NFR BLD AUTO: 28 % (ref 19.6–45.3)
MCH RBC QN AUTO: 32.5 PG (ref 26.6–33)
MCHC RBC AUTO-ENTMCNC: 34.4 G/DL (ref 31.5–35.7)
MCV RBC AUTO: 94.6 FL (ref 79–97)
MONOCYTES # BLD AUTO: 1 10*3/MM3 (ref 0.1–0.9)
MONOCYTES NFR BLD AUTO: 10.1 % (ref 5–12)
NEUTROPHILS NFR BLD AUTO: 5.78 10*3/MM3 (ref 1.7–7)
NEUTROPHILS NFR BLD AUTO: 58.1 % (ref 42.7–76)
NITRITE UR QL STRIP: NEGATIVE
NRBC BLD AUTO-RTO: 0.1 /100 WBC (ref 0–0.2)
PH UR STRIP.AUTO: 7.5 [PH] (ref 5–8)
PLATELET # BLD AUTO: 345 10*3/MM3 (ref 140–450)
PMV BLD AUTO: 9.7 FL (ref 6–12)
POTASSIUM SERPL-SCNC: 4.7 MMOL/L (ref 3.5–5.2)
PROT SERPL-MCNC: 7.1 G/DL (ref 6–8.5)
PROT UR QL STRIP: NEGATIVE
RBC # BLD AUTO: 5.35 10*6/MM3 (ref 4.14–5.8)
RBC # UR STRIP: NORMAL /HPF
REF LAB TEST METHOD: NORMAL
SODIUM SERPL-SCNC: 140 MMOL/L (ref 136–145)
SP GR UR STRIP: 1.01 (ref 1–1.03)
SQUAMOUS #/AREA URNS HPF: NORMAL /HPF
TRIGL SERPL-MCNC: 182 MG/DL (ref 0–150)
TSH SERPL DL<=0.05 MIU/L-ACNC: 1.69 UIU/ML (ref 0.27–4.2)
UROBILINOGEN UR QL STRIP: NORMAL
VLDLC SERPL-MCNC: 32 MG/DL (ref 5–40)
WBC # UR STRIP: NORMAL /HPF
WBC NRBC COR # BLD: 9.95 10*3/MM3 (ref 3.4–10.8)

## 2021-12-30 PROCEDURE — 93306 TTE W/DOPPLER COMPLETE: CPT | Performed by: INTERNAL MEDICINE

## 2021-12-30 PROCEDURE — 86140 C-REACTIVE PROTEIN: CPT

## 2021-12-30 PROCEDURE — 93016 CV STRESS TEST SUPVJ ONLY: CPT | Performed by: INTERNAL MEDICINE

## 2021-12-30 PROCEDURE — 93017 CV STRESS TEST TRACING ONLY: CPT

## 2021-12-30 PROCEDURE — 0 TECHNETIUM TETROFOSMIN KIT: Performed by: INTERNAL MEDICINE

## 2021-12-30 PROCEDURE — A9502 TC99M TETROFOSMIN: HCPCS | Performed by: INTERNAL MEDICINE

## 2021-12-30 PROCEDURE — 81001 URINALYSIS AUTO W/SCOPE: CPT

## 2021-12-30 PROCEDURE — 78452 HT MUSCLE IMAGE SPECT MULT: CPT

## 2021-12-30 PROCEDURE — 85652 RBC SED RATE AUTOMATED: CPT

## 2021-12-30 PROCEDURE — 78452 HT MUSCLE IMAGE SPECT MULT: CPT | Performed by: INTERNAL MEDICINE

## 2021-12-30 PROCEDURE — 93018 CV STRESS TEST I&R ONLY: CPT | Performed by: INTERNAL MEDICINE

## 2021-12-30 PROCEDURE — 83036 HEMOGLOBIN GLYCOSYLATED A1C: CPT

## 2021-12-30 PROCEDURE — 84443 ASSAY THYROID STIM HORMONE: CPT

## 2021-12-30 PROCEDURE — 80053 COMPREHEN METABOLIC PANEL: CPT

## 2021-12-30 PROCEDURE — 85025 COMPLETE CBC W/AUTO DIFF WBC: CPT

## 2021-12-30 PROCEDURE — 36415 COLL VENOUS BLD VENIPUNCTURE: CPT

## 2021-12-30 PROCEDURE — 80061 LIPID PANEL: CPT

## 2021-12-30 PROCEDURE — 93306 TTE W/DOPPLER COMPLETE: CPT

## 2021-12-30 RX ADMIN — TETROFOSMIN 1 DOSE: 1.38 INJECTION, POWDER, LYOPHILIZED, FOR SOLUTION INTRAVENOUS at 12:03

## 2021-12-30 RX ADMIN — TETROFOSMIN 1 DOSE: 1.38 INJECTION, POWDER, LYOPHILIZED, FOR SOLUTION INTRAVENOUS at 10:31

## 2021-12-31 LAB
BH CV NUCLEAR PRIOR STUDY: 3
BH CV REST NUCLEAR ISOTOPE DOSE: 7.9 MCI
BH CV STRESS BP STAGE 1: NORMAL
BH CV STRESS BP STAGE 2: NORMAL
BH CV STRESS DURATION MIN STAGE 1: 3
BH CV STRESS DURATION MIN STAGE 2: 3
BH CV STRESS DURATION SEC STAGE 1: 0
BH CV STRESS DURATION SEC STAGE 2: 0
BH CV STRESS GRADE STAGE 1: 10
BH CV STRESS GRADE STAGE 2: 12
BH CV STRESS HR STAGE 1: 106
BH CV STRESS HR STAGE 2: 130
BH CV STRESS METS STAGE 1: 5
BH CV STRESS METS STAGE 2: 7.5
BH CV STRESS NUCLEAR ISOTOPE DOSE: 21.9 MCI
BH CV STRESS PROTOCOL 1: NORMAL
BH CV STRESS RECOVERY BP: NORMAL MMHG
BH CV STRESS RECOVERY HR: 93 BPM
BH CV STRESS SPEED STAGE 1: 1.7
BH CV STRESS SPEED STAGE 2: 2.5
BH CV STRESS STAGE 1: 1
BH CV STRESS STAGE 2: 2
LV EF NUC BP: 45 %
MAXIMAL PREDICTED HEART RATE: 164 BPM
PERCENT MAX PREDICTED HR: 79.27 %
STRESS BASELINE BP: NORMAL MMHG
STRESS BASELINE HR: 79 BPM
STRESS PERCENT HR: 93 %
STRESS POST PEAK BP: NORMAL MMHG
STRESS POST PEAK HR: 130 BPM
STRESS TARGET HR: 139 BPM

## 2022-01-03 ENCOUNTER — TELEPHONE (OUTPATIENT)
Dept: CARDIOLOGY | Facility: CLINIC | Age: 57
End: 2022-01-03

## 2022-01-03 NOTE — TELEPHONE ENCOUNTER
[facility] doesn't do CT's with/without so it either needs to be an order for CT with or a CT without can't be both. Please advise.

## 2022-01-06 LAB
BH CV ECHO MEAS - ACS: 2.5 CM
BH CV ECHO MEAS - AO MAX PG (FULL): 2.5 MMHG
BH CV ECHO MEAS - AO MAX PG: 5.6 MMHG
BH CV ECHO MEAS - AO MEAN PG (FULL): 1.8 MMHG
BH CV ECHO MEAS - AO MEAN PG: 3.2 MMHG
BH CV ECHO MEAS - AO ROOT AREA (BSA CORRECTED): 1.8
BH CV ECHO MEAS - AO ROOT AREA: 10.9 CM^2
BH CV ECHO MEAS - AO ROOT DIAM: 3.7 CM
BH CV ECHO MEAS - AO V2 MAX: 118.1 CM/SEC
BH CV ECHO MEAS - AO V2 MEAN: 86.5 CM/SEC
BH CV ECHO MEAS - AO V2 VTI: 27.1 CM
BH CV ECHO MEAS - AVA(I,A): 2.7 CM^2
BH CV ECHO MEAS - AVA(I,D): 2.7 CM^2
BH CV ECHO MEAS - AVA(V,A): 2.8 CM^2
BH CV ECHO MEAS - AVA(V,D): 2.8 CM^2
BH CV ECHO MEAS - BSA(HAYCOCK): 2.1 M^2
BH CV ECHO MEAS - BSA: 2.1 M^2
BH CV ECHO MEAS - BZI_BMI: 29 KILOGRAMS/M^2
BH CV ECHO MEAS - BZI_METRIC_HEIGHT: 177.8 CM
BH CV ECHO MEAS - BZI_METRIC_WEIGHT: 91.6 KG
BH CV ECHO MEAS - EDV(CUBED): 161.1 ML
BH CV ECHO MEAS - EDV(MOD-SP4): 96.1 ML
BH CV ECHO MEAS - EDV(TEICH): 143.8 ML
BH CV ECHO MEAS - EF(CUBED): 51.1 %
BH CV ECHO MEAS - EF(MOD-BP): 56 %
BH CV ECHO MEAS - EF(MOD-SP4): 56 %
BH CV ECHO MEAS - EF(TEICH): 42.6 %
BH CV ECHO MEAS - ESV(CUBED): 78.8 ML
BH CV ECHO MEAS - ESV(MOD-SP4): 42.3 ML
BH CV ECHO MEAS - ESV(TEICH): 82.5 ML
BH CV ECHO MEAS - FS: 21.2 %
BH CV ECHO MEAS - IVS/LVPW: 0.91
BH CV ECHO MEAS - IVSD: 1 CM
BH CV ECHO MEAS - LA DIMENSION(2D): 3.5 CM
BH CV ECHO MEAS - LV DIASTOLIC VOL/BSA (35-75): 45.8 ML/M^2
BH CV ECHO MEAS - LV MASS(C)D: 227.1 GRAMS
BH CV ECHO MEAS - LV MASS(C)DI: 108.4 GRAMS/M^2
BH CV ECHO MEAS - LV MAX PG: 3 MMHG
BH CV ECHO MEAS - LV MEAN PG: 1.4 MMHG
BH CV ECHO MEAS - LV SYSTOLIC VOL/BSA (12-30): 20.2 ML/M^2
BH CV ECHO MEAS - LV V1 MAX: 85.7 CM/SEC
BH CV ECHO MEAS - LV V1 MEAN: 53.8 CM/SEC
BH CV ECHO MEAS - LV V1 VTI: 18.8 CM
BH CV ECHO MEAS - LVIDD: 5.4 CM
BH CV ECHO MEAS - LVIDS: 4.3 CM
BH CV ECHO MEAS - LVOT AREA: 3.9 CM^2
BH CV ECHO MEAS - LVOT DIAM: 2.2 CM
BH CV ECHO MEAS - LVPWD: 1.1 CM
BH CV ECHO MEAS - MV A MAX VEL: 79.8 CM/SEC
BH CV ECHO MEAS - MV DEC SLOPE: 361.1 CM/SEC^2
BH CV ECHO MEAS - MV DEC TIME: 0.25 SEC
BH CV ECHO MEAS - MV E MAX VEL: 90.8 CM/SEC
BH CV ECHO MEAS - MV E/A: 1.1
BH CV ECHO MEAS - MV MAX PG: 2.7 MMHG
BH CV ECHO MEAS - MV MEAN PG: 1.3 MMHG
BH CV ECHO MEAS - MV V2 MAX: 82.6 CM/SEC
BH CV ECHO MEAS - MV V2 MEAN: 54.4 CM/SEC
BH CV ECHO MEAS - MV V2 VTI: 26.1 CM
BH CV ECHO MEAS - MVA(VTI): 2.8 CM^2
BH CV ECHO MEAS - PA MAX PG (FULL): 1.5 MMHG
BH CV ECHO MEAS - PA MAX PG: 2.7 MMHG
BH CV ECHO MEAS - PA V2 MAX: 80.4 CM/SEC
BH CV ECHO MEAS - PULM A REVS DUR: 0.07 SEC
BH CV ECHO MEAS - PULM A REVS VEL: 32.3 CM/SEC
BH CV ECHO MEAS - PULM DIAS VEL: 47.6 CM/SEC
BH CV ECHO MEAS - PULM S/D: 1.3
BH CV ECHO MEAS - PULM SYS VEL: 64.1 CM/SEC
BH CV ECHO MEAS - PVA(V,A): 3.6 CM^2
BH CV ECHO MEAS - PVA(V,D): 3.6 CM^2
BH CV ECHO MEAS - QP/QS: 0.9
BH CV ECHO MEAS - RV MAX PG: 1.1 MMHG
BH CV ECHO MEAS - RV MEAN PG: 0.66 MMHG
BH CV ECHO MEAS - RV V1 MAX: 53.1 CM/SEC
BH CV ECHO MEAS - RV V1 MEAN: 39.3 CM/SEC
BH CV ECHO MEAS - RV V1 VTI: 12.1 CM
BH CV ECHO MEAS - RVDD: 3 CM
BH CV ECHO MEAS - RVOT AREA: 5.4 CM^2
BH CV ECHO MEAS - RVOT DIAM: 2.6 CM
BH CV ECHO MEAS - SI(AO): 141 ML/M^2
BH CV ECHO MEAS - SI(CUBED): 39.3 ML/M^2
BH CV ECHO MEAS - SI(LVOT): 35.1 ML/M^2
BH CV ECHO MEAS - SI(MOD-SP4): 25.7 ML/M^2
BH CV ECHO MEAS - SI(TEICH): 29.3 ML/M^2
BH CV ECHO MEAS - SV(AO): 295.6 ML
BH CV ECHO MEAS - SV(CUBED): 82.3 ML
BH CV ECHO MEAS - SV(LVOT): 73.5 ML
BH CV ECHO MEAS - SV(MOD-SP4): 53.8 ML
BH CV ECHO MEAS - SV(RVOT): 65.9 ML
BH CV ECHO MEAS - SV(TEICH): 61.3 ML

## 2022-01-12 RX ORDER — METOPROLOL SUCCINATE 100 MG/1
100 TABLET, EXTENDED RELEASE ORAL DAILY
Qty: 90 TABLET | Refills: 1 | Status: SHIPPED | OUTPATIENT
Start: 2022-01-12 | End: 2022-01-14 | Stop reason: SDUPTHER

## 2022-01-14 RX ORDER — METOPROLOL SUCCINATE 100 MG/1
100 TABLET, EXTENDED RELEASE ORAL DAILY
Qty: 90 TABLET | Refills: 1 | Status: SHIPPED | OUTPATIENT
Start: 2022-01-14 | End: 2022-03-08 | Stop reason: SDUPTHER

## 2022-01-14 RX ORDER — LISINOPRIL 20 MG/1
20 TABLET ORAL EVERY 24 HOURS
Qty: 90 TABLET | Refills: 1 | Status: SHIPPED | OUTPATIENT
Start: 2022-01-14 | End: 2022-03-08 | Stop reason: SDUPTHER

## 2022-02-08 ENCOUNTER — OFFICE VISIT (OUTPATIENT)
Dept: CARDIOLOGY | Facility: CLINIC | Age: 57
End: 2022-02-08

## 2022-02-08 VITALS
HEIGHT: 70 IN | BODY MASS INDEX: 28.72 KG/M2 | WEIGHT: 200.6 LBS | DIASTOLIC BLOOD PRESSURE: 82 MMHG | RESPIRATION RATE: 18 BRPM | SYSTOLIC BLOOD PRESSURE: 124 MMHG | HEART RATE: 88 BPM

## 2022-02-08 DIAGNOSIS — R07.89 CHEST DISCOMFORT: Primary | ICD-10-CM

## 2022-02-08 DIAGNOSIS — R06.09 DOE (DYSPNEA ON EXERTION): ICD-10-CM

## 2022-02-08 DIAGNOSIS — Z01.818 PRE-OP TESTING: ICD-10-CM

## 2022-02-08 DIAGNOSIS — I10 ESSENTIAL HYPERTENSION: ICD-10-CM

## 2022-02-08 DIAGNOSIS — I42.0 CARDIOMYOPATHY, DILATED: ICD-10-CM

## 2022-02-08 DIAGNOSIS — R94.39 ABNORMAL STRESS TEST: ICD-10-CM

## 2022-02-08 PROCEDURE — 99214 OFFICE O/P EST MOD 30 MIN: CPT | Performed by: INTERNAL MEDICINE

## 2022-02-08 RX ORDER — ASPIRIN 81 MG/1
81 TABLET ORAL DAILY
COMMUNITY
End: 2023-03-07

## 2022-02-08 NOTE — PROGRESS NOTES
Cardiology Clinic Note  Alpesh Rock MD, PhD    Subjective:     Encounter Date:02/08/2022      Patient ID: Titus Jarvis is a 56 y.o. male.    Chief Complaint:  Chief Complaint   Patient presents with   • Follow-up       HPI:    Previously  I the pleasure to see this very pleasant 56-year-old gentleman is a new patient.  He has a cardiac history of cardiomyopathy historically with a EF of 45% with dilated cardiomyopathy with nonobstructive CAD on heart cath 18 years ago.  Continues to smoke was counseled to quit but has not quit yet but has cut down substantially since.  CT scan of the chest reveals emphysema and inflammatory changes with diffuse nodular patterns..  He still says he been feeling worse with much worsening shortness of breath.  On my prior and current exam I do hear wheezing as well as bronchial breath sounds likely concerning for advanced COPD or emphysema.  He has no lower extremity edema no near syncope or palpitations but says his windows very short he is short of breath with even minimal activity.  No other palpitations PND orthopnea or other complaints no recent fevers chill sweats nausea vomiting or diarrhea, he denies any chest pain but says he does get some heaviness occasional.  His echo demonstrated EF of 55% with some abnormal septal motion concerning for localized inferoseptal hypokinesis, stress test revealed markedly diminished counts in the inferior and inferoseptal wall concerning for inferoseptal infarct versus attenuation artifact with summed difference score of only 3 but summed stress score of 17.  In the combination of symptoms with abnormal stress test, incomplete visualization of the inferior wall inferoseptal wall with nonobstructive CAD but 18 years ago and significant risk factors with smoking hypertension and others as well as symptoms of chest discomfort and shortness of breath patient likely appropriate for invasive ischemic evaluation for definitive evaluation  "which she is amenable for after informed consent all risks and benefits were discussed.     Historical data copied forward from previous encounters in EMR including the history, exam, and assessment/plan has been reviewed and is unchanged unless noted otherwise.        Regular rate and rhythm no rubs gallops Norman  1 out of 6 stock murmur  Radial pulses 2+ equal bilaterally  No clubbing cyanosis or edema  Normal cap refill  Mild expiratory wheezing on exam  Intact grossly  Soft nontender nondistended      Historical data copied forward from previous encounters in EMR including the history, exam, and assessment/plan has been reviewed and is unchanged unless noted otherwise.    Cardiac medicines reviewed with risk, benefits, and necessity of each discussed.    Risk and benefit of cardiac testing reviewed including death heart attack stroke pain bleeding infection need for vascular /cardiovascular surgery were discussed and the patient     Objective:         /82 (BP Location: Left arm, Patient Position: Sitting)   Pulse 88   Resp 18   Ht 177.8 cm (70\")   Wt 91 kg (200 lb 9.6 oz)   BMI 28.78 kg/m²     Physical Exam  As above  Assessment:         Atypical chest pain  Shortness of breath dyspnea on exertion  Abnormal stress test  Abnormal echo with regional hypokinesis  Scheduled for catheterization for definitive evaluation of coronary anatomy  Abnormal CT scan demonstrating emphysema, smoking cessation again recommended at length, made referral to pulmonology, may need inhalers or bronchodilator therapy with or without steroid and inhaled fashion  Continue present medicines    Further recommendation to follow findings of invasive work-up    Alpesh Rock MD, PhD      The pleasure to be involved in this patient's cardiovascular care.  Please call with any questions or concerns  Alpesh Rock MD, PhD    Most recent EKG as reviewed and interpreted by me:  Procedures     Most recent echo as reviewed and " interpreted by me:  Results for orders placed during the hospital encounter of 12/30/21    Adult Transthoracic Echo Complete W/ Cont if Necessary Per Protocol    Interpretation Summary  · Left ventricular systolic function is low normal.  · Left ventricular ejection fraction is 55%  · Left ventricular diastolic function was normal.  · Mild hypokinesis of septum is noted      Most recent stress test as reviewed and interpreted by me:  Results for orders placed during the hospital encounter of 12/30/21    Stress Test With Myocardial Perfusion One Day    Interpretation Summary  · Left ventricular ejection fraction is mildly reduced. (Calculated EF = 45%).  · Impressions are consistent with an intermediate risk study.  · Inferior wall has fixed perfusion abnormality with summed rest score of 17 Summed rest score of 14 with no significant reversibility or gavin-infarct ischemia seen EF mildly reduced globally at 45% by gated imaging Summed difference score of 0 Cannot rule out significant diaphragmatic or GI attenuation as responsible for decreased counts in the inferior wall, overall imaging is improved with stress compared to resting images.  · Findings consistent with an equivocal ECG stress test.    Stress ECG did not reach target heart rate  No changes at submaximal stress  No arrhythmias seen  No chest pain reported  Nuclear imaging with abnormal perfusion in stress in the inferior and apical inferolateral wall which is fixed compared to resting images actually improved with stress overall  Summed stress score 14, summed rest score of 17 with summed difference score of 0  No gavin-infarct ischemia  Cannot rule out inferior infarct versus significant diaphragmatic or GI attenuation, no reversibility seen  Overall EF mildly reduced globally at 45%  Intermediate risk study by criteria      Most recent cardiac catheterization as reviewed interpreted by me:  No results found for this or any previous visit.    The  following portions of the patient's history were reviewed and updated as appropriate: allergies, current medications, past family history, past medical history, past social history, past surgical history and problem list.      ROS:  14 point review of systems negative except as mentioned above    Current Outpatient Medications:   •  aspirin 81 MG chewable tablet, Chew 81 mg Daily., Disp: , Rfl:   •  lisinopril (PRINIVIL,ZESTRIL) 20 MG tablet, Take 1 tablet by mouth Daily., Disp: 90 tablet, Rfl: 1  •  metoprolol succinate XL (TOPROL-XL) 100 MG 24 hr tablet, Take 1 tablet by mouth Daily., Disp: 90 tablet, Rfl: 1  •  pantoprazole (PROTONIX) 40 MG EC tablet, Daily., Disp: , Rfl:     Problem List:  Patient Active Problem List   Diagnosis   • Saeed's esophagus   • Cardiomyopathy, dilated (HCC)   • Cigarette smoker   • Essential hypertension     Past Medical History:  Past Medical History:   Diagnosis Date   • Cardiomyopathy, dilated (HCC) 9/18/2019   • Essential hypertension 9/18/2019     Past Surgical History:  History reviewed. No pertinent surgical history.  Social History:  Social History     Socioeconomic History   • Marital status: Single   Tobacco Use   • Smoking status: Current Every Day Smoker   • Smokeless tobacco: Never Used   Substance and Sexual Activity   • Alcohol use: Yes   • Drug use: Defer   • Sexual activity: Defer     Allergies:  No Known Allergies  Immunizations:  Immunization History   Administered Date(s) Administered   • Flu Vaccine Intradermal Quad 18-64YR 10/05/2017, 09/01/2020            In-Office Procedure(s):  No orders to display        ASCVD RIsk Score::  The 10-year ASCVD risk score (Armida MIHAELA Li, et al., 2013) is: 12.4%    Values used to calculate the score:      Age: 56 years      Sex: Male      Is Non- : No      Diabetic: No      Tobacco smoker: Yes      Systolic Blood Pressure: 124 mmHg      Is BP treated: Yes      HDL Cholesterol: 50 mg/dL      Total  Cholesterol: 205 mg/dL    Imaging:    Results for orders placed in visit on 02/15/21    XR Chest 2 View    Narrative  Examination: XR CHEST 2 VW-    Date of Exam: 2/15/2021 8:43 AM    Indication: PRE OPS, JOINT PAIN; Z01.818-Encounter for other  preprocedural examination; M25.50-Pain in unspecified joint.  Preop left  knee arthroplasty. Previous smoking history.    Comparison: Radiograph 03/25/2015    Technique: 2 radiographic views of the chest were obtained.    Findings:  There are no airspace consolidations. No pleural effusions. No  pneumothorax. The heart size is normal. The pulmonary vasculature  appears within normal limits. No acute osseous abnormality identified.    Impression  No acute cardiopulmonary process.    Electronically Signed By-Carmelina Riojas MD On:2/15/2021 9:08 AM  This report was finalized on [id number] by  Carmelina Riojas MD.       Results for orders placed in visit on 12/20/21    CT Chest With & Without Contrast      Results for orders placed in visit on 12/20/21    CT Chest With & Without Contrast      Lab Review:   Hospital Outpatient Visit on 12/30/2021   Component Date Value   • Target HR (85%) 12/30/2021 139    • Max. Pred. HR (100%) 12/30/2021 164    • BH CV STRESS PROTOCOL 1 12/30/2021 Gaurav    • Stage 1 12/30/2021 1    • HR Stage 1 12/30/2021 106    • BP Stage 1 12/30/2021 155/80    • Duration Min Stage 1 12/30/2021 3    • Duration Sec Stage 1 12/30/2021 0    • Grade Stage 1 12/30/2021 10    • Speed Stage 1 12/30/2021 1.7    • BH CV STRESS METS STAGE 1 12/30/2021 5    • Stage 2 12/30/2021 2    • HR Stage 2 12/30/2021 130    • BP Stage 2 12/30/2021 170/85    • Duration Min Stage 2 12/30/2021 3    • Duration Sec Stage 2 12/30/2021 0    • Grade Stage 2 12/30/2021 12    • Speed Stage 2 12/30/2021 2.5    • BH CV STRESS METS STAGE 2 12/30/2021 7.5    • Baseline HR 12/30/2021 79    • Baseline BP 12/30/2021 135/80    • Peak HR 12/30/2021 130    • Percent Max Pred HR 12/30/2021 79.27    •  Percent Target HR 12/30/2021 93    • Peak BP 12/30/2021 170/85    • Recovery HR 12/30/2021 93    • Recovery BP 12/30/2021 135/75    • Nuclear Prior Study 12/30/2021 3    • BH CV REST NUCLEAR ISOTO* 12/30/2021 7.9    • BH CV STRESS NUCLEAR ISO* 12/30/2021 21.9    • Nuc Stress EF 12/30/2021 45    Hospital Outpatient Visit on 12/30/2021   Component Date Value   • BSA 12/30/2021 2.1    • RVIDd 12/30/2021 3.0    • IVSd 12/30/2021 1.0    • LVIDd 12/30/2021 5.4    • LVIDs 12/30/2021 4.3    • LVPWd 12/30/2021 1.1    • IVS/LVPW 12/30/2021 0.91    • FS 12/30/2021 21.2    • EDV(Teich) 12/30/2021 143.8    • ESV(Teich) 12/30/2021 82.5    • EF(Teich) 12/30/2021 42.6    • EDV(cubed) 12/30/2021 161.1    • ESV(cubed) 12/30/2021 78.8    • EF(cubed) 12/30/2021 51.1    • LV mass(C)d 12/30/2021 227.1    • LV mass(C)dI 12/30/2021 108.4    • SV(Teich) 12/30/2021 61.3    • SI(Teich) 12/30/2021 29.3    • SV(cubed) 12/30/2021 82.3    • SI(cubed) 12/30/2021 39.3    • Ao root diam 12/30/2021 3.7    • Ao root area 12/30/2021 10.9    • ACS 12/30/2021 2.5    • LVOT diam 12/30/2021 2.2    • LVOT area 12/30/2021 3.9    • RVOT diam 12/30/2021 2.6    • RVOT area 12/30/2021 5.4    • EDV(MOD-sp4) 12/30/2021 96.1    • ESV(MOD-sp4) 12/30/2021 42.3    • EF(MOD-sp4) 12/30/2021 56.0    • SV(MOD-sp4) 12/30/2021 53.8    • SI(MOD-sp4) 12/30/2021 25.7    • Ao root area (BSA correc* 12/30/2021 1.8    • LV Matute Vol (BSA correct* 12/30/2021 45.8    • LV Sys Vol (BSA correcte* 12/30/2021 20.2    • MV E max regine 12/30/2021 90.8    • MV A max regine 12/30/2021 79.8    • MV E/A 12/30/2021 1.1    • MV V2 max 12/30/2021 82.6    • MV max PG 12/30/2021 2.7    • MV V2 mean 12/30/2021 54.4    • MV mean PG 12/30/2021 1.3    • MV V2 VTI 12/30/2021 26.1    • MVA(VTI) 12/30/2021 2.8    • MV dec slope 12/30/2021 361.1    • MV dec time 12/30/2021 0.25    • Ao pk regine 12/30/2021 118.1    • Ao max PG 12/30/2021 5.6    • Ao max PG (full) 12/30/2021 2.5    • Ao V2 mean 12/30/2021 86.5     • Ao mean PG 12/30/2021 3.2    • Ao mean PG (full) 12/30/2021 1.8    • Ao V2 VTI 12/30/2021 27.1    • JANET(I,A) 12/30/2021 2.7    • JANET(I,D) 12/30/2021 2.7    • JANET(V,A) 12/30/2021 2.8    • JANET(V,D) 12/30/2021 2.8    • LV V1 max PG 12/30/2021 3.0    • LV V1 mean PG 12/30/2021 1.4    • LV V1 max 12/30/2021 85.7    • LV V1 mean 12/30/2021 53.8    • LV V1 VTI 12/30/2021 18.8    • SV(Ao) 12/30/2021 295.6    • SI(Ao) 12/30/2021 141.0    • SV(LVOT) 12/30/2021 73.5    • SV(RVOT) 12/30/2021 65.9    • SI(LVOT) 12/30/2021 35.1    • PA V2 max 12/30/2021 80.4    • PA max PG 12/30/2021 2.7    • PA max PG (full) 12/30/2021 1.5    • BH CV ECHO MICHAEL - PVA(V,* 12/30/2021 3.6    • BH CV ECHO MICHAEL - PVA(V,* 12/30/2021 3.6    • RV V1 max PG 12/30/2021 1.1    • RV V1 mean PG 12/30/2021 0.66    • RV V1 max 12/30/2021 53.1    • RV V1 mean 12/30/2021 39.3    • RV V1 VTI 12/30/2021 12.1    • Pulm Sys Fran 12/30/2021 64.1    • Pulm Matute Fran 12/30/2021 47.6    • Pulm S/D 12/30/2021 1.3    • Qp/Qs 12/30/2021 0.9    • Pulm A Revs Dur 12/30/2021 0.07    • Pulm A Revs Fran 12/30/2021 32.3    • BH CV ECHO MICHAEL - BZI_BMI 12/30/2021 29.0    • BH CV ECHO MICHAEL - BSA(HA* 12/30/2021 2.1    • BH CV ECHO MICHAEL - BZI_ME* 12/30/2021 91.6    • BH CV ECHO MICHAEL - BZI_ME* 12/30/2021 177.8    • EF(MOD-bp) 12/30/2021 56.0    • LA dimension(2D) 12/30/2021 3.5    Lab on 12/30/2021   Component Date Value   • Glucose 12/30/2021 89    • BUN 12/30/2021 9    • Creatinine 12/30/2021 0.76    • Sodium 12/30/2021 140    • Potassium 12/30/2021 4.7    • Chloride 12/30/2021 101    • CO2 12/30/2021 31.7*   • Calcium 12/30/2021 9.5    • Total Protein 12/30/2021 7.1    • Albumin 12/30/2021 4.90    • ALT (SGPT) 12/30/2021 41    • AST (SGOT) 12/30/2021 23    • Alkaline Phosphatase 12/30/2021 96    • Total Bilirubin 12/30/2021 0.3    • eGFR Non  Amer 12/30/2021 106    • Globulin 12/30/2021 2.2    • A/G Ratio 12/30/2021 2.2    • BUN/Creatinine Ratio 12/30/2021 11.8    • Anion  Gap 12/30/2021 7.3    • TSH 12/30/2021 1.690    • Hemoglobin A1C 12/30/2021 5.7*   • Total Cholesterol 12/30/2021 205*   • Triglycerides 12/30/2021 182*   • HDL Cholesterol 12/30/2021 50    • LDL Cholesterol  12/30/2021 123*   • VLDL Cholesterol 12/30/2021 32    • LDL/HDL Ratio 12/30/2021 2.37    • Sed Rate 12/30/2021 14    • C-Reactive Protein 12/30/2021 0.38    • WBC 12/30/2021 9.95    • RBC 12/30/2021 5.35    • Hemoglobin 12/30/2021 17.4    • Hematocrit 12/30/2021 50.6    • MCV 12/30/2021 94.6    • MCH 12/30/2021 32.5    • MCHC 12/30/2021 34.4    • RDW 12/30/2021 12.7    • RDW-SD 12/30/2021 43.5    • MPV 12/30/2021 9.7    • Platelets 12/30/2021 345    • Neutrophil % 12/30/2021 58.1    • Lymphocyte % 12/30/2021 28.0    • Monocyte % 12/30/2021 10.1    • Eosinophil % 12/30/2021 1.7    • Basophil % 12/30/2021 0.6    • Immature Grans % 12/30/2021 1.5*   • Neutrophils, Absolute 12/30/2021 5.78    • Lymphocytes, Absolute 12/30/2021 2.79    • Monocytes, Absolute 12/30/2021 1.00*   • Eosinophils, Absolute 12/30/2021 0.17    • Basophils, Absolute 12/30/2021 0.06    • Immature Grans, Absolute 12/30/2021 0.15*   • nRBC 12/30/2021 0.1    • Color, UA 12/30/2021 Yellow    • Appearance, UA 12/30/2021 Clear    • pH, UA 12/30/2021 7.5    • Specific [place], UA 12/30/2021 1.012    • Glucose, UA 12/30/2021 Negative    • Ketones, UA 12/30/2021 Negative    • Bilirubin, UA 12/30/2021 Negative    • Blood, UA 12/30/2021 Negative    • Protein, UA 12/30/2021 Negative    • Leuk Esterase, UA 12/30/2021 Negative    • Nitrite, UA 12/30/2021 Negative    • Urobilinogen, UA 12/30/2021 0.2 E.U./dL    • RBC, UA 12/30/2021 None Seen    • WBC, UA 12/30/2021 0-2    • Bacteria, UA 12/30/2021 None Seen    • Squamous Epithelial Cell* 12/30/2021 None Seen    • Hyaline Casts, UA 12/30/2021 None Seen    • Methodology 12/30/2021 Manual Light Microscopy      Recent labs reviewed and interpreted for clinical significance and application            Level of  Care:           Alpesh Rock MD  02/08/22  .

## 2022-02-08 NOTE — H&P (VIEW-ONLY)
Cardiology Clinic Note  Alpesh Rock MD, PhD    Subjective:     Encounter Date:02/08/2022      Patient ID: Titus Jarvis is a 56 y.o. male.    Chief Complaint:  Chief Complaint   Patient presents with   • Follow-up       HPI:    Previously  I the pleasure to see this very pleasant 56-year-old gentleman is a new patient.  He has a cardiac history of cardiomyopathy historically with a EF of 45% with dilated cardiomyopathy with nonobstructive CAD on heart cath 18 years ago.  Continues to smoke was counseled to quit but has not quit yet but has cut down substantially since.  CT scan of the chest reveals emphysema and inflammatory changes with diffuse nodular patterns..  He still says he been feeling worse with much worsening shortness of breath.  On my prior and current exam I do hear wheezing as well as bronchial breath sounds likely concerning for advanced COPD or emphysema.  He has no lower extremity edema no near syncope or palpitations but says his windows very short he is short of breath with even minimal activity.  No other palpitations PND orthopnea or other complaints no recent fevers chill sweats nausea vomiting or diarrhea, he denies any chest pain but says he does get some heaviness occasional.  His echo demonstrated EF of 55% with some abnormal septal motion concerning for localized inferoseptal hypokinesis, stress test revealed markedly diminished counts in the inferior and inferoseptal wall concerning for inferoseptal infarct versus attenuation artifact with summed difference score of only 3 but summed stress score of 17.  In the combination of symptoms with abnormal stress test, incomplete visualization of the inferior wall inferoseptal wall with nonobstructive CAD but 18 years ago and significant risk factors with smoking hypertension and others as well as symptoms of chest discomfort and shortness of breath patient likely appropriate for invasive ischemic evaluation for definitive evaluation  "which she is amenable for after informed consent all risks and benefits were discussed.     Historical data copied forward from previous encounters in EMR including the history, exam, and assessment/plan has been reviewed and is unchanged unless noted otherwise.        Regular rate and rhythm no rubs gallops [place]  1 out of 6 stock murmur  Radial pulses 2+ equal bilaterally  No clubbing cyanosis or edema  Normal cap refill  Mild expiratory wheezing on exam  Intact grossly  Soft nontender nondistended      Historical data copied forward from previous encounters in EMR including the history, exam, and assessment/plan has been reviewed and is unchanged unless noted otherwise.    Cardiac medicines reviewed with risk, benefits, and necessity of each discussed.    Risk and benefit of cardiac testing reviewed including death heart attack stroke pain bleeding infection need for vascular /cardiovascular surgery were discussed and the patient     Objective:         /82 (BP Location: Left arm, Patient Position: Sitting)   Pulse 88   Resp 18   Ht 177.8 cm (70\")   Wt 91 kg (200 lb 9.6 oz)   BMI 28.78 kg/m²     Physical Exam  As above  Assessment:         Atypical chest pain  Shortness of breath dyspnea on exertion  Abnormal stress test  Abnormal echo with regional hypokinesis  Scheduled for catheterization for definitive evaluation of coronary anatomy  Abnormal CT scan demonstrating emphysema, smoking cessation again recommended at length, made referral to pulmonology, may need inhalers or bronchodilator therapy with or without steroid and inhaled fashion  Continue present medicines    Further recommendation to follow findings of invasive work-up    Alpesh Rock MD, PhD      The pleasure to be involved in this patient's cardiovascular care.  Please call with any questions or concerns  Alpesh Rock MD, PhD    Most recent EKG as reviewed and interpreted by me:  Procedures     Most recent echo as reviewed and " interpreted by me:  Results for orders placed during the hospital encounter of 12/30/21    Adult Transthoracic Echo Complete W/ Cont if Necessary Per Protocol    Interpretation Summary  · Left ventricular systolic function is low normal.  · Left ventricular ejection fraction is 55%  · Left ventricular diastolic function was normal.  · Mild hypokinesis of septum is noted      Most recent stress test as reviewed and interpreted by me:  Results for orders placed during the hospital encounter of 12/30/21    Stress Test With Myocardial Perfusion One Day    Interpretation Summary  · Left ventricular ejection fraction is mildly reduced. (Calculated EF = 45%).  · Impressions are consistent with an intermediate risk study.  · Inferior wall has fixed perfusion abnormality with summed rest score of 17 Summed rest score of 14 with no significant reversibility or gavin-infarct ischemia seen EF mildly reduced globally at 45% by gated imaging Summed difference score of 0 Cannot rule out significant diaphragmatic or GI attenuation as responsible for decreased counts in the inferior wall, overall imaging is improved with stress compared to resting images.  · Findings consistent with an equivocal ECG stress test.    Stress ECG did not reach target heart rate  No changes at submaximal stress  No arrhythmias seen  No chest pain reported  Nuclear imaging with abnormal perfusion in stress in the inferior and apical inferolateral wall which is fixed compared to resting images actually improved with stress overall  Summed stress score 14, summed rest score of 17 with summed difference score of 0  No gavin-infarct ischemia  Cannot rule out inferior infarct versus significant diaphragmatic or GI attenuation, no reversibility seen  Overall EF mildly reduced globally at 45%  Intermediate risk study by criteria      Most recent cardiac catheterization as reviewed interpreted by me:  No results found for this or any previous visit.    The  following portions of the patient's history were reviewed and updated as appropriate: allergies, current medications, past family history, past medical history, past social history, past surgical history and problem list.      ROS:  14 point review of systems negative except as mentioned above    Current Outpatient Medications:   •  aspirin 81 MG chewable tablet, Chew 81 mg Daily., Disp: , Rfl:   •  lisinopril (PRINIVIL,ZESTRIL) 20 MG tablet, Take 1 tablet by mouth Daily., Disp: 90 tablet, Rfl: 1  •  metoprolol succinate XL (TOPROL-XL) 100 MG 24 hr tablet, Take 1 tablet by mouth Daily., Disp: 90 tablet, Rfl: 1  •  pantoprazole (PROTONIX) 40 MG EC tablet, Daily., Disp: , Rfl:     Problem List:  Patient Active Problem List   Diagnosis   • Saeed's esophagus   • Cardiomyopathy, dilated (HCC)   • Cigarette smoker   • Essential hypertension     Past Medical History:  Past Medical History:   Diagnosis Date   • Cardiomyopathy, dilated (HCC) 9/18/2019   • Essential hypertension 9/18/2019     Past Surgical History:  History reviewed. No pertinent surgical history.  Social History:  Social History     Socioeconomic History   • Marital status: Single   Tobacco Use   • Smoking status: Current Every Day Smoker   • Smokeless tobacco: Never Used   Substance and Sexual Activity   • Alcohol use: Yes   • Drug use: Defer   • Sexual activity: Defer     Allergies:  No Known Allergies  Immunizations:  Immunization History   Administered Date(s) Administered   • Flu Vaccine Intradermal Quad 18-64YR 10/05/2017, 09/01/2020            In-Office Procedure(s):  No orders to display        ASCVD RIsk Score::  The 10-year ASCVD risk score (Armida MIHAELA Li, et al., 2013) is: 12.4%    Values used to calculate the score:      Age: 56 years      Sex: Male      Is Non- : No      Diabetic: No      Tobacco smoker: Yes      Systolic Blood Pressure: 124 mmHg      Is BP treated: Yes      HDL Cholesterol: 50 mg/dL      Total  Cholesterol: 205 mg/dL    Imaging:    Results for orders placed in visit on 02/15/21    XR Chest 2 View    Narrative  Examination: XR CHEST 2 VW-    Date of Exam: 2/15/2021 8:43 AM    Indication: PRE OPS, JOINT PAIN; Z01.818-Encounter for other  preprocedural examination; M25.50-Pain in unspecified joint.  Preop left  knee arthroplasty. Previous smoking history.    Comparison: Radiograph 03/25/2015    Technique: 2 radiographic views of the chest were obtained.    Findings:  There are no airspace consolidations. No pleural effusions. No  pneumothorax. The heart size is normal. The pulmonary vasculature  appears within normal limits. No acute osseous abnormality identified.    Impression  No acute cardiopulmonary process.    Electronically Signed By-Carmelina Riojas MD On:2/15/2021 9:08 AM  This report was finalized on [id number] by  Carmelina Riojas MD.       Results for orders placed in visit on 12/20/21    CT Chest With & Without Contrast      Results for orders placed in visit on 12/20/21    CT Chest With & Without Contrast      Lab Review:   Hospital Outpatient Visit on 12/30/2021   Component Date Value   • Target HR (85%) 12/30/2021 139    • Max. Pred. HR (100%) 12/30/2021 164    • BH CV STRESS PROTOCOL 1 12/30/2021 Gaurav    • Stage 1 12/30/2021 1    • HR Stage 1 12/30/2021 106    • BP Stage 1 12/30/2021 155/80    • Duration Min Stage 1 12/30/2021 3    • Duration Sec Stage 1 12/30/2021 0    • Grade Stage 1 12/30/2021 10    • Speed Stage 1 12/30/2021 1.7    • BH CV STRESS METS STAGE 1 12/30/2021 5    • Stage 2 12/30/2021 2    • HR Stage 2 12/30/2021 130    • BP Stage 2 12/30/2021 170/85    • Duration Min Stage 2 12/30/2021 3    • Duration Sec Stage 2 12/30/2021 0    • Grade Stage 2 12/30/2021 12    • Speed Stage 2 12/30/2021 2.5    • BH CV STRESS METS STAGE 2 12/30/2021 7.5    • Baseline HR 12/30/2021 79    • Baseline BP 12/30/2021 135/80    • Peak HR 12/30/2021 130    • Percent Max Pred HR 12/30/2021 79.27    •  Percent Target HR 12/30/2021 93    • Peak BP 12/30/2021 170/85    • Recovery HR 12/30/2021 93    • Recovery BP 12/30/2021 135/75    • Nuclear Prior Study 12/30/2021 3    • BH CV REST NUCLEAR ISOTO* 12/30/2021 7.9    • BH CV STRESS NUCLEAR ISO* 12/30/2021 21.9    • Nuc Stress EF 12/30/2021 45    Hospital Outpatient Visit on 12/30/2021   Component Date Value   • BSA 12/30/2021 2.1    • RVIDd 12/30/2021 3.0    • IVSd 12/30/2021 1.0    • LVIDd 12/30/2021 5.4    • LVIDs 12/30/2021 4.3    • LVPWd 12/30/2021 1.1    • IVS/LVPW 12/30/2021 0.91    • FS 12/30/2021 21.2    • EDV(Teich) 12/30/2021 143.8    • ESV(Teich) 12/30/2021 82.5    • EF(Teich) 12/30/2021 42.6    • EDV(cubed) 12/30/2021 161.1    • ESV(cubed) 12/30/2021 78.8    • EF(cubed) 12/30/2021 51.1    • LV mass(C)d 12/30/2021 227.1    • LV mass(C)dI 12/30/2021 108.4    • SV(Teich) 12/30/2021 61.3    • SI(Teich) 12/30/2021 29.3    • SV(cubed) 12/30/2021 82.3    • SI(cubed) 12/30/2021 39.3    • Ao root diam 12/30/2021 3.7    • Ao root area 12/30/2021 10.9    • ACS 12/30/2021 2.5    • LVOT diam 12/30/2021 2.2    • LVOT area 12/30/2021 3.9    • RVOT diam 12/30/2021 2.6    • RVOT area 12/30/2021 5.4    • EDV(MOD-sp4) 12/30/2021 96.1    • ESV(MOD-sp4) 12/30/2021 42.3    • EF(MOD-sp4) 12/30/2021 56.0    • SV(MOD-sp4) 12/30/2021 53.8    • SI(MOD-sp4) 12/30/2021 25.7    • Ao root area (BSA correc* 12/30/2021 1.8    • LV Matute Vol (BSA correct* 12/30/2021 45.8    • LV Sys Vol (BSA correcte* 12/30/2021 20.2    • MV E max regine 12/30/2021 90.8    • MV A max regine 12/30/2021 79.8    • MV E/A 12/30/2021 1.1    • MV V2 max 12/30/2021 82.6    • MV max PG 12/30/2021 2.7    • MV V2 mean 12/30/2021 54.4    • MV mean PG 12/30/2021 1.3    • MV V2 VTI 12/30/2021 26.1    • MVA(VTI) 12/30/2021 2.8    • MV dec slope 12/30/2021 361.1    • MV dec time 12/30/2021 0.25    • Ao pk regine 12/30/2021 118.1    • Ao max PG 12/30/2021 5.6    • Ao max PG (full) 12/30/2021 2.5    • Ao V2 mean 12/30/2021 86.5     • Ao mean PG 12/30/2021 3.2    • Ao mean PG (full) 12/30/2021 1.8    • Ao V2 VTI 12/30/2021 27.1    • JANET(I,A) 12/30/2021 2.7    • JANET(I,D) 12/30/2021 2.7    • JANET(V,A) 12/30/2021 2.8    • JANET(V,D) 12/30/2021 2.8    • LV V1 max PG 12/30/2021 3.0    • LV V1 mean PG 12/30/2021 1.4    • LV V1 max 12/30/2021 85.7    • LV V1 mean 12/30/2021 53.8    • LV V1 VTI 12/30/2021 18.8    • SV(Ao) 12/30/2021 295.6    • SI(Ao) 12/30/2021 141.0    • SV(LVOT) 12/30/2021 73.5    • SV(RVOT) 12/30/2021 65.9    • SI(LVOT) 12/30/2021 35.1    • PA V2 max 12/30/2021 80.4    • PA max PG 12/30/2021 2.7    • PA max PG (full) 12/30/2021 1.5    • BH CV ECHO MICHAEL - PVA(V,* 12/30/2021 3.6    • BH CV ECHO MICHAEL - PVA(V,* 12/30/2021 3.6    • RV V1 max PG 12/30/2021 1.1    • RV V1 mean PG 12/30/2021 0.66    • RV V1 max 12/30/2021 53.1    • RV V1 mean 12/30/2021 39.3    • RV V1 VTI 12/30/2021 12.1    • Pulm Sys Fran 12/30/2021 64.1    • Pulm Matute Fran 12/30/2021 47.6    • Pulm S/D 12/30/2021 1.3    • Qp/Qs 12/30/2021 0.9    • Pulm A Revs Dur 12/30/2021 0.07    • Pulm A Revs Fran 12/30/2021 32.3    • BH CV ECHO MICHAEL - BZI_BMI 12/30/2021 29.0    • BH CV ECHO MICHAEL - BSA(HA* 12/30/2021 2.1    • BH CV ECHO MICHAEL - BZI_ME* 12/30/2021 91.6    • BH CV ECHO MICHAEL - BZI_ME* 12/30/2021 177.8    • EF(MOD-bp) 12/30/2021 56.0    • LA dimension(2D) 12/30/2021 3.5    Lab on 12/30/2021   Component Date Value   • Glucose 12/30/2021 89    • BUN 12/30/2021 9    • Creatinine 12/30/2021 0.76    • Sodium 12/30/2021 140    • Potassium 12/30/2021 4.7    • Chloride 12/30/2021 101    • CO2 12/30/2021 31.7*   • Calcium 12/30/2021 9.5    • Total Protein 12/30/2021 7.1    • Albumin 12/30/2021 4.90    • ALT (SGPT) 12/30/2021 41    • AST (SGOT) 12/30/2021 23    • Alkaline Phosphatase 12/30/2021 96    • Total Bilirubin 12/30/2021 0.3    • eGFR Non  Amer 12/30/2021 106    • Globulin 12/30/2021 2.2    • A/G Ratio 12/30/2021 2.2    • BUN/Creatinine Ratio 12/30/2021 11.8    • Anion  Gap 12/30/2021 7.3    • TSH 12/30/2021 1.690    • Hemoglobin A1C 12/30/2021 5.7*   • Total Cholesterol 12/30/2021 205*   • Triglycerides 12/30/2021 182*   • HDL Cholesterol 12/30/2021 50    • LDL Cholesterol  12/30/2021 123*   • VLDL Cholesterol 12/30/2021 32    • LDL/HDL Ratio 12/30/2021 2.37    • Sed Rate 12/30/2021 14    • C-Reactive Protein 12/30/2021 0.38    • WBC 12/30/2021 9.95    • RBC 12/30/2021 5.35    • Hemoglobin 12/30/2021 17.4    • Hematocrit 12/30/2021 50.6    • MCV 12/30/2021 94.6    • MCH 12/30/2021 32.5    • MCHC 12/30/2021 34.4    • RDW 12/30/2021 12.7    • RDW-SD 12/30/2021 43.5    • MPV 12/30/2021 9.7    • Platelets 12/30/2021 345    • Neutrophil % 12/30/2021 58.1    • Lymphocyte % 12/30/2021 28.0    • Monocyte % 12/30/2021 10.1    • Eosinophil % 12/30/2021 1.7    • Basophil % 12/30/2021 0.6    • Immature Grans % 12/30/2021 1.5*   • Neutrophils, Absolute 12/30/2021 5.78    • Lymphocytes, Absolute 12/30/2021 2.79    • Monocytes, Absolute 12/30/2021 1.00*   • Eosinophils, Absolute 12/30/2021 0.17    • Basophils, Absolute 12/30/2021 0.06    • Immature Grans, Absolute 12/30/2021 0.15*   • nRBC 12/30/2021 0.1    • Color, UA 12/30/2021 Yellow    • Appearance, UA 12/30/2021 Clear    • pH, UA 12/30/2021 7.5    • Specific [place], UA 12/30/2021 1.012    • Glucose, UA 12/30/2021 Negative    • Ketones, UA 12/30/2021 Negative    • Bilirubin, UA 12/30/2021 Negative    • Blood, UA 12/30/2021 Negative    • Protein, UA 12/30/2021 Negative    • Leuk Esterase, UA 12/30/2021 Negative    • Nitrite, UA 12/30/2021 Negative    • Urobilinogen, UA 12/30/2021 0.2 E.U./dL    • RBC, UA 12/30/2021 None Seen    • WBC, UA 12/30/2021 0-2    • Bacteria, UA 12/30/2021 None Seen    • Squamous Epithelial Cell* 12/30/2021 None Seen    • Hyaline Casts, UA 12/30/2021 None Seen    • Methodology 12/30/2021 Manual Light Microscopy      Recent labs reviewed and interpreted for clinical significance and application            Level of  Care:           Alpesh Rock MD  02/08/22  .

## 2022-02-16 ENCOUNTER — TELEPHONE (OUTPATIENT)
Dept: FAMILY MEDICINE CLINIC | Facility: CLINIC | Age: 57
End: 2022-02-16

## 2022-02-16 NOTE — TELEPHONE ENCOUNTER
Caller: Titus Jarvis    Relationship: Self    Best call back number: [phone]    What medication are you requesting: WELLBUTRIN, NICOTINE PATCHES    What are your current symptoms: N/A    How long have you been experiencing symptoms: N/A    Have you had these symptoms before:    [x] Yes  [] No    Have you been treated for these symptoms before:   [x] Yes  [] No    If a prescription is needed, what is your preferred pharmacy and phone number:  [id number] [address] AT [facility] - [phone]  - [phone] [phone]    Additional notes:    PATIENT CALLED AND SAID HE WAS HAVING A HEART CATH DONE TOMORROW AND WOULD LIKE TO QUIT SMOKING    HE SAID THAT HIS CARDIOLOGIST RECOMMENDED THE NICOTINE PATCHES AND WELLBUTRIN

## 2022-02-17 ENCOUNTER — LAB (OUTPATIENT)
Dept: LAB | Facility: HOSPITAL | Age: 57
End: 2022-02-17

## 2022-02-17 ENCOUNTER — HOSPITAL ENCOUNTER (OUTPATIENT)
Facility: HOSPITAL | Age: 57
Setting detail: HOSPITAL OUTPATIENT SURGERY
Discharge: HOME | End: 2022-02-17
Attending: INTERNAL MEDICINE | Admitting: INTERNAL MEDICINE

## 2022-02-17 VITALS
RESPIRATION RATE: 20 BRPM | TEMPERATURE: 98.3 F | WEIGHT: 196.21 LBS | HEART RATE: 80 BPM | HEIGHT: 70 IN | DIASTOLIC BLOOD PRESSURE: 68 MMHG | OXYGEN SATURATION: 94 % | BODY MASS INDEX: 28.09 KG/M2 | SYSTOLIC BLOOD PRESSURE: 127 MMHG

## 2022-02-17 DIAGNOSIS — R94.39 ABNORMAL STRESS TEST: ICD-10-CM

## 2022-02-17 DIAGNOSIS — R07.89 CHEST DISCOMFORT: ICD-10-CM

## 2022-02-17 DIAGNOSIS — Z01.818 PRE-OP TESTING: ICD-10-CM

## 2022-02-17 LAB
ALBUMIN SERPL-MCNC: 4.2 G/DL (ref 3.5–5.2)
ALBUMIN/GLOB SERPL: 1.6 G/DL
ALP SERPL-CCNC: 97 U/L (ref 39–117)
ALT SERPL W P-5'-P-CCNC: 50 U/L (ref 1–41)
ANION GAP SERPL CALCULATED.3IONS-SCNC: 10 MMOL/L (ref 5–15)
AST SERPL-CCNC: 29 U/L (ref 1–40)
BILIRUB SERPL-MCNC: 0.4 MG/DL (ref 0–1.2)
BUN SERPL-MCNC: 13 MG/DL (ref 6–20)
BUN/CREAT SERPL: 15.7 (ref 7–25)
CALCIUM SPEC-SCNC: 9.5 MG/DL (ref 8.6–10.5)
CHLORIDE SERPL-SCNC: 102 MMOL/L (ref 98–107)
CO2 SERPL-SCNC: 30 MMOL/L (ref 22–29)
CREAT SERPL-MCNC: 0.83 MG/DL (ref 0.76–1.27)
DEPRECATED RDW RBC AUTO: 47.3 FL (ref 37–54)
EOSINOPHIL # BLD MANUAL: 0.48 10*3/MM3 (ref 0–0.4)
EOSINOPHIL NFR BLD MANUAL: 6 % (ref 0.3–6.2)
ERYTHROCYTE [DISTWIDTH] IN BLOOD BY AUTOMATED COUNT: 14 % (ref 12.3–15.4)
GFR SERPL CREATININE-BSD FRML MDRD: 96 ML/MIN/1.73
GLOBULIN UR ELPH-MCNC: 2.7 GM/DL
GLUCOSE SERPL-MCNC: 105 MG/DL (ref 65–99)
HCT VFR BLD AUTO: 48.9 % (ref 37.5–51)
HGB BLD-MCNC: 17 G/DL (ref 13–17.7)
INR PPP: 0.94 (ref 0.93–1.1)
LYMPHOCYTES # BLD MANUAL: 3.04 10*3/MM3 (ref 0.7–3.1)
LYMPHOCYTES NFR BLD MANUAL: 6 % (ref 5–12)
MCH RBC QN AUTO: 33.1 PG (ref 26.6–33)
MCHC RBC AUTO-ENTMCNC: 34.8 G/DL (ref 31.5–35.7)
MCV RBC AUTO: 95.2 FL (ref 79–97)
MONOCYTES # BLD: 0.48 10*3/MM3 (ref 0.1–0.9)
NEUTROPHILS # BLD AUTO: 4 10*3/MM3 (ref 1.7–7)
NEUTROPHILS NFR BLD MANUAL: 50 % (ref 42.7–76)
PLAT MORPH BLD: NORMAL
PLATELET # BLD AUTO: 304 10*3/MM3 (ref 140–450)
PMV BLD AUTO: 7.3 FL (ref 6–12)
POIKILOCYTOSIS BLD QL SMEAR: ABNORMAL
POTASSIUM SERPL-SCNC: 4.5 MMOL/L (ref 3.5–5.2)
PROT SERPL-MCNC: 6.9 G/DL (ref 6–8.5)
PROTHROMBIN TIME: 10.5 SECONDS (ref 9.6–11.7)
RBC # BLD AUTO: 5.14 10*6/MM3 (ref 4.14–5.8)
SARS-COV-2 RNA PNL SPEC NAA+PROBE: NOT DETECTED
SCAN SLIDE: NORMAL
SODIUM SERPL-SCNC: 142 MMOL/L (ref 136–145)
VARIANT LYMPHS NFR BLD MANUAL: 2 % (ref 0–5)
VARIANT LYMPHS NFR BLD MANUAL: 36 % (ref 19.6–45.3)
WBC MORPH BLD: NORMAL
WBC NRBC COR # BLD: 8 10*3/MM3 (ref 3.4–10.8)

## 2022-02-17 PROCEDURE — 93458 L HRT ARTERY/VENTRICLE ANGIO: CPT | Performed by: INTERNAL MEDICINE

## 2022-02-17 PROCEDURE — 25010000002 HEPARIN (PORCINE) PER 1000 UNITS: Performed by: INTERNAL MEDICINE

## 2022-02-17 PROCEDURE — C1769 GUIDE WIRE: HCPCS | Performed by: INTERNAL MEDICINE

## 2022-02-17 PROCEDURE — 99152 MOD SED SAME PHYS/QHP 5/>YRS: CPT | Performed by: INTERNAL MEDICINE

## 2022-02-17 PROCEDURE — U0003 INFECTIOUS AGENT DETECTION BY NUCLEIC ACID (DNA OR RNA); SEVERE ACUTE RESPIRATORY SYNDROME CORONAVIRUS 2 (SARS-COV-2) (CORONAVIRUS DISEASE [COVID-19]), AMPLIFIED PROBE TECHNIQUE, MAKING USE OF HIGH THROUGHPUT TECHNOLOGIES AS DESCRIBED BY CMS-2020-01-R: HCPCS

## 2022-02-17 PROCEDURE — 85025 COMPLETE CBC W/AUTO DIFF WBC: CPT | Performed by: INTERNAL MEDICINE

## 2022-02-17 PROCEDURE — C1894 INTRO/SHEATH, NON-LASER: HCPCS | Performed by: INTERNAL MEDICINE

## 2022-02-17 PROCEDURE — 36415 COLL VENOUS BLD VENIPUNCTURE: CPT | Performed by: INTERNAL MEDICINE

## 2022-02-17 PROCEDURE — 85610 PROTHROMBIN TIME: CPT | Performed by: INTERNAL MEDICINE

## 2022-02-17 PROCEDURE — 25010000002 FENTANYL CITRATE (PF) 100 MCG/2ML SOLUTION: Performed by: INTERNAL MEDICINE

## 2022-02-17 PROCEDURE — C9803 HOPD COVID-19 SPEC COLLECT: HCPCS

## 2022-02-17 PROCEDURE — 85007 BL SMEAR W/DIFF WBC COUNT: CPT | Performed by: INTERNAL MEDICINE

## 2022-02-17 PROCEDURE — 25010000002 MIDAZOLAM PER 1 MG: Performed by: INTERNAL MEDICINE

## 2022-02-17 PROCEDURE — 80053 COMPREHEN METABOLIC PANEL: CPT | Performed by: INTERNAL MEDICINE

## 2022-02-17 PROCEDURE — 0 IOPAMIDOL PER 1 ML: Performed by: INTERNAL MEDICINE

## 2022-02-17 RX ORDER — NITROGLYCERIN 5 MG/ML
INJECTION, SOLUTION INTRAVENOUS AS NEEDED
Status: DISCONTINUED | OUTPATIENT
Start: 2022-02-17 | End: 2022-02-17 | Stop reason: HOSPADM

## 2022-02-17 RX ORDER — ACETAMINOPHEN 325 MG/1
650 TABLET ORAL EVERY 4 HOURS PRN
Status: DISCONTINUED | OUTPATIENT
Start: 2022-02-17 | End: 2022-02-17 | Stop reason: HOSPADM

## 2022-02-17 RX ORDER — NICARDIPINE HYDROCHLORIDE 2.5 MG/ML
INJECTION INTRAVENOUS AS NEEDED
Status: DISCONTINUED | OUTPATIENT
Start: 2022-02-17 | End: 2022-02-17 | Stop reason: HOSPADM

## 2022-02-17 RX ORDER — FENTANYL CITRATE 50 UG/ML
INJECTION, SOLUTION INTRAMUSCULAR; INTRAVENOUS AS NEEDED
Status: DISCONTINUED | OUTPATIENT
Start: 2022-02-17 | End: 2022-02-17 | Stop reason: HOSPADM

## 2022-02-17 RX ORDER — LIDOCAINE HYDROCHLORIDE 20 MG/ML
INJECTION, SOLUTION INFILTRATION; PERINEURAL AS NEEDED
Status: DISCONTINUED | OUTPATIENT
Start: 2022-02-17 | End: 2022-02-17 | Stop reason: HOSPADM

## 2022-02-17 RX ORDER — MIDAZOLAM HYDROCHLORIDE 1 MG/ML
INJECTION INTRAMUSCULAR; INTRAVENOUS AS NEEDED
Status: DISCONTINUED | OUTPATIENT
Start: 2022-02-17 | End: 2022-02-17 | Stop reason: HOSPADM

## 2022-02-17 RX ORDER — SODIUM CHLORIDE 9 MG/ML
INJECTION, SOLUTION INTRAVENOUS CONTINUOUS PRN
Status: COMPLETED | OUTPATIENT
Start: 2022-02-17 | End: 2022-02-17

## 2022-02-17 RX ORDER — HEPARIN SODIUM 1000 [USP'U]/ML
INJECTION, SOLUTION INTRAVENOUS; SUBCUTANEOUS AS NEEDED
Status: DISCONTINUED | OUTPATIENT
Start: 2022-02-17 | End: 2022-02-17 | Stop reason: HOSPADM

## 2022-02-17 NOTE — DISCHARGE INSTRUCTIONS
Post Cath Instructions    Call Dr. Rock’s office to schedule a follow up appointment in 2-4 weeks at [phone].  Specific Physician Instructions:     1) Drink plenty of fluids for the next 24 hours.  This helps to eliminate the dye used in your procedure through urination.  You may resume a normal diet; however, try to avoid foods that would cause gas or constipation.    2) Sedative medication given to you during your catheterization may decrease your judgement and reaction time for up to 24-48 hours.  Therefore:  a. DO NOT drive or operate hazardous machinery (48 hours)  b. DO NOT consume alcoholic beverages  c. DO NOT make any important/legal decisions  d. Have someone stay with you for at least 24 hours    3) To allow proper healing and prevent bleeding, the following activities are to be strictly avoided for the next 24-48 hours:  a. Excessive bending at wound site  b. Straining (anything that would tense up muscles around the affected puncture site)  c. Lifting objects greater than 5 pounds, pushing, or pulling for 5 days  i. For Arm Cases:  1. No flexing at the puncture site, such as hammering, golfing, bowling, or swinging any objects    4) Keep the puncture site clean and dry.  You may remove the dressing tomorrow and replace it with a band-aid for at least one additional day.  Gently clean the site with mild soap and water.  No scrubbing/rubbing and lightly pat the area dry.  Showers are acceptable; however, avoid submerging in water (tub baths, hot tubs, swimming pools, dishwater, etc…) for at least one week.  The site should be completely healed before resuming these activities to reduce the risk of infection.  Check the site often.  Watch for signs and symptoms of infection and notify your physician if any of the following occur:  a. Bleeding or an increase in swelling at the puncture site  b. Fever  c. Increased soreness around puncture site  d. Foul odor or significant drainage from the puncture  site  e. Swelling, redness, or warmth at the puncture site    **A bruise or small “pea sized” lump under the skin at the puncture site is not unusual.  This should disappear within 3-4 weeks.**  5) CONTACT YOUR PHYSICIAN OR CALL 911 IF YOU EXPERIENCE ANY OF THE FOLLOWING:  a. Increased angina (chest pain) or frequent sensations of pressure, burning, pain, or other discomfort in the chest, arm, jaws, or stomach  b. Lightheadedness, dizziness, faint feeling, sweating, or difficulty breathing  c. Odd sensation changes like numbness, tingling, coldness, or pain in the arm or leg in which the catheter was inserted  d. Limb in which the catheter was inserted becomes pale/bluish in color    IMPORTANT:  Although this occurs very rarely, if you should develop bright red or excessive bleeding, feel a “pop” inside at the insertion site, or notice a sudden increase in swelling larger than a walnut, you should call 911.  Hold continuous firm pressure to the access site until emergency personnel arrive.  It is best if someone else can do this for you.

## 2022-02-18 NOTE — TELEPHONE ENCOUNTER
Patient called back to check on this.  It has been 48 hours since he requested these things.  Can you please handle this before you leave today?

## 2022-03-08 ENCOUNTER — OFFICE VISIT (OUTPATIENT)
Dept: CARDIOLOGY | Facility: CLINIC | Age: 57
End: 2022-03-08

## 2022-03-08 VITALS
WEIGHT: 206.2 LBS | BODY MASS INDEX: 29.52 KG/M2 | SYSTOLIC BLOOD PRESSURE: 130 MMHG | HEIGHT: 70 IN | RESPIRATION RATE: 18 BRPM | DIASTOLIC BLOOD PRESSURE: 82 MMHG | HEART RATE: 75 BPM

## 2022-03-08 DIAGNOSIS — Z76.89 ENCOUNTER TO ESTABLISH CARE: Primary | ICD-10-CM

## 2022-03-08 PROCEDURE — 99214 OFFICE O/P EST MOD 30 MIN: CPT | Performed by: INTERNAL MEDICINE

## 2022-03-08 RX ORDER — LISINOPRIL 20 MG/1
20 TABLET ORAL EVERY 24 HOURS
Qty: 90 TABLET | Refills: 3 | Status: SHIPPED | OUTPATIENT
Start: 2022-03-08 | End: 2022-07-22

## 2022-03-08 RX ORDER — METOPROLOL SUCCINATE 100 MG/1
100 TABLET, EXTENDED RELEASE ORAL DAILY
Qty: 90 TABLET | Refills: 3 | Status: SHIPPED | OUTPATIENT
Start: 2022-03-08 | End: 2022-11-03 | Stop reason: SDUPTHER

## 2022-03-28 NOTE — PROGRESS NOTES
Cardiology Clinic Note  Alpesh Rock MD, PhD    Subjective:     Encounter Date:03/08/2022      Patient ID: Titus Jarvis is a 56 y.o. male.    Chief Complaint:  Chief Complaint   Patient presents with   • Follow-up       HPI:      I the pleasure to see this very pleasant 56-year-old gentleman is a new patient.  He has a cardiac history of cardiomyopathy historically with a EF of 45% with dilated cardiomyopathy with nonobstructive CAD on heart cath 18 years ago.  Continues to smoke was counseled to quit but has not quit yet but has cut down substantially since.  CT scan of the chest reveals emphysema and inflammatory changes with diffuse nodular patterns..  He still says he been feeling worse with much worsening shortness of breath.  On my prior and current exam I do hear wheezing as well as bronchial breath sounds likely concerning for advanced COPD or emphysema.  He has no lower extremity edema no near syncope or palpitations but says his windows very short he is short of breath with even minimal activity.  No other palpitations PND orthopnea or other complaints no recent fevers chill sweats nausea vomiting or diarrhea, he denies any chest pain but says he does get some heaviness occasional.  His echo demonstrated EF of 55% with some abnormal septal motion concerning for localized inferoseptal hypokinesis, stress test revealed markedly diminished counts in the inferior and inferoseptal wall concerning for inferoseptal infarct versus attenuation artifact with summed difference score of only 3 but summed stress score of 17.  In the combination of symptoms with abnormal stress test, incomplete visualization of the inferior wall inferoseptal wall with nonobstructive CAD but 18 years ago and significant risk factors with smoking hypertension and others as well as symptoms of chest discomfort and shortness of breath patient likely appropriate for invasive ischemic evaluation for definitive evaluation which she  "is amenable for after informed consent all risks and benefits were discussed.  Ultimately he underwent left heart catheterization demonstrating left main with no disease, no angiographic disease of the LAD system, no angiographic disease of the circumflex system and 30% mild luminal regularities in the right with widely patent PDA and PLV with normal EF but slightly high LVEDP at 15-18 mmHg with gentle diuresis recommended is also likely treatment for microvascular dysfunction.  We did discuss the emphysematous changes that are seen on CT with evidence of lung damage from smoking, voiced understanding, has been smoking for over 40 years, no new angina, referred to pulmonary for treatment     Historical data copied forward from previous encounters in EMR including the history, exam, and assessment/plan has been reviewed and is unchanged unless noted otherwise.        Regular rate and rhythm no rubs gallops Alsen  1 out of 6 stock murmur  Radial pulses 2+ equal bilaterally  No clubbing cyanosis or edema  Normal cap refill  Mild expiratory wheezing on exam  Intact grossly  Soft nontender nondistended       Historical data copied forward from previous encounters in EMR including the history, exam, and assessment/plan has been reviewed and is unchanged unless noted otherwise.    Cardiac medicines reviewed with risk, benefits, and necessity of each discussed.    Risk and benefit of cardiac testing reviewed including death heart attack stroke pain bleeding infection need for vascular /cardiovascular surgery were discussed and the patient     Objective:         /82 (BP Location: Left arm, Patient Position: Sitting)   Pulse 75   Resp 18   Ht 177.8 cm (70\")   Wt 93.5 kg (206 lb 3.2 oz)   BMI 29.59 kg/m²     Physical Exam  Regular rate and rhythm no rubs gallops Alsen  No clubbing cyanosis or edema  Normal CV exam no bruits or JVD  Assessment:         Diagnoses and all orders for this visit:    1. Encounter to " establish care (Primary)  -     Ambulatory Referral to Family Practice    Other orders  -     lisinopril (PRINIVIL,ZESTRIL) 20 MG tablet; Take 1 tablet by mouth Daily.  Dispense: 90 tablet; Refill: 3  -     metoprolol succinate XL (TOPROL-XL) 100 MG 24 hr tablet; Take 1 tablet by mouth Daily.  Dispense: 90 tablet; Refill: 3           Plan:          Atypical chest pain  Shortness of breath dyspnea on exertion  Abnormal stress test  Abnormal CT with emphysematous changes  High filling pressures with mild diastolic dysfunction  Normal coronaries with nonobstructive CAD most severely 30% RCA    Abnormal CT scan demonstrating emphysema, smoking cessation again recommended at length, made referral to pulmonology, may need inhalers or bronchodilator therapy with or without steroid and inhaled fashion  Continue present medicines    Primary prevention goals  Continue aspirin beta-blocker ACE inhibitor, blood pressures are controlled  Diet and exercise per HI guidelines  Smoking cessation    Refer to primary care  Refill medicines  Smoking cessation counseling provided no quit date established     Further recommendation to follow findings of invasive work-up     Alpesh Rock MD, PhD      The pleasure to be involved in this patient's cardiovascular care.  Please call with any questions or concerns  Alpesh Rock MD, PhD    Most recent EKG as reviewed and interpreted by me:  Procedures     Most recent echo as reviewed and interpreted by me:  Results for orders placed during the hospital encounter of 12/30/21    Adult Transthoracic Echo Complete W/ Cont if Necessary Per Protocol    Interpretation Summary  · Left ventricular systolic function is low normal.  · Left ventricular ejection fraction is 55%  · Left ventricular diastolic function was normal.  · Mild hypokinesis of septum is noted      Most recent stress test as reviewed and interpreted by me:  Results for orders placed during the hospital encounter of  12/30/21    Stress Test With Myocardial Perfusion One Day    Interpretation Summary  · Left ventricular ejection fraction is mildly reduced. (Calculated EF = 45%).  · Impressions are consistent with an intermediate risk study.  · Inferior wall has fixed perfusion abnormality with summed rest score of 17 Summed rest score of 14 with no significant reversibility or gavin-infarct ischemia seen EF mildly reduced globally at 45% by gated imaging Summed difference score of 0 Cannot rule out significant diaphragmatic or GI attenuation as responsible for decreased counts in the inferior wall, overall imaging is improved with stress compared to resting images.  · Findings consistent with an equivocal ECG stress test.    Stress ECG did not reach target heart rate  No changes at submaximal stress  No arrhythmias seen  No chest pain reported  Nuclear imaging with abnormal perfusion in stress in the inferior and apical inferolateral wall which is fixed compared to resting images actually improved with stress overall  Summed stress score 14, summed rest score of 17 with summed difference score of 0  No gavin-infarct ischemia  Cannot rule out inferior infarct versus significant diaphragmatic or GI attenuation, no reversibility seen  Overall EF mildly reduced globally at 45%  Intermediate risk study by criteria      Most recent cardiac catheterization as reviewed interpreted by me:  Results for orders placed during the hospital encounter of 02/17/22    Cardiac Catheterization/Vascular Study    Narrative   Alpesh Rock MD, PhD  [facility] cardiology  Date of service 2-17-22    Procedure  1.  Left heart catheterization with coronary angiography left ventriculography in ARIZMENDI position    Indication  Unexplained dyspnea on exertion, abnormal stress test    After informed consent patient was brought to the Cath Lab sterilely prepped and draped in usual fashion expose the right wrist for right radial access via  micropuncture modified center technique with placement of 5/6 slender sheath under ultrasound guidance which was aspirated flushed with heparinized saline.  Standard cocktail of heparin nitroglycerin and Cardene was administered via the sheath followed by a 3 5 guidewire which of the aortic valve with diagnostic 5 Hungarian JL 3 and JR4 catheters for selective left and right coronary angiography.  Angiography was performed with no obstructive CAD in the left system with essentially smooth contour of the coronary system.  Right had nonobstructive CAD most severely 30% in the midportion but widely patent vessels with ARTEMIO-3 flow throughout.  LV gram demonstrated normal EF however waveforms in the LV demonstrated elevated filling pressures EDP of 18 likely some degree of diastolic dysfunction.  All catheters equipment removed the sheath flushed with heparinized saline which was ultimately removed for placement of a TR band with immediate hemostasis.  There were no complications patient left the Cath Lab chest pain-free hemodynamically electrically stable alert talking to staff neurologically grossly intact bilaterally    Complications none  Blood loss less than 5 cc  Contrast usage 85 cc for the totality of the procedure  Moderate conscious sedation time of 30 minutes with IV Versed and fentanyl administered by registered nurse with complete ECG pulse oximetry and hemodynamic monitoring throughout the entirety the case observed by me    Findings  1.  Open aortic pressure of 105/62  2.  Closing pressure was unchanged  3.  Elevated EDP at 18  4.  No transaortic valve or LVOT gradient seen  5.  LV function low normal 50%    Angiography  1 left main large-caliber vessel with no angiographic disease  2.  LAD has 1 diagonal branch which essentially has no angiographic disease throughout the LAD system as well as numerous septal perforators, the LAD does reach the apex  3 the circumflex is nondominant with an obtuse marginal  branch has no angiographic disease  4.  RCA is a large caliber dominant vessel with PLV and PDA branches distally.  There is mid 30% lumen regularities with ARTEMIO-3 flow distally and no disease of the PDA L PLV segment significantly with only lumen regularities.    Conclusions and recommendations  1 nonobstructive CAD most severely 30% the RCA otherwise left side has minimal luminal regularities, low normal EF 50%, elevated EDP concerning for diastolic dysfunction with EDP of 18  2.  Shortness of breath likely explained by heart filling pressures as well as COPD and emphysema changes that are seen on CT.  Patient advised to quit smoking.  Gentle diuresis to decrease filling pressures as well as afterload reduction if indicated.    Patient be discharged and followed up in cardiology clinic for further recommendations titration of medical therapy    Patient be discharged safely today Home    Alpesh Rock MD, PhD    The following portions of the patient's history were reviewed and updated as appropriate: allergies, current medications, past family history, past medical history, past social history, past surgical history and problem list.      ROS:  14 point review of systems negative except as mentioned above    Current Outpatient Medications:   •  aspirin 81 MG EC tablet, Take 81 mg by mouth Daily., Disp: , Rfl:   •  lisinopril (PRINIVIL,ZESTRIL) 20 MG tablet, Take 1 tablet by mouth Daily., Disp: 90 tablet, Rfl: 3  •  metoprolol succinate XL (TOPROL-XL) 100 MG 24 hr tablet, Take 1 tablet by mouth Daily., Disp: 90 tablet, Rfl: 3  •  pantoprazole (PROTONIX) 40 MG EC tablet, Take 40 mg by mouth Daily., Disp: , Rfl:     Problem List:  Patient Active Problem List   Diagnosis   • Saeed's esophagus   • Cardiomyopathy, dilated (HCC)   • Cigarette smoker   • Essential hypertension   • Chest discomfort   • Abnormal stress test     Past Medical History:  Past Medical History:   Diagnosis Date   • Cardiomyopathy, dilated (HCC)  2019   • Essential hypertension 2019     Past Surgical History:  Past Surgical History:   Procedure Laterality Date   • CARDIAC CATHETERIZATION     • CARDIAC CATHETERIZATION N/A 2022    Procedure: Left Heart Cath; Right Radial;  Surgeon: Alpesh Rock MD;  Location: [facility] CATH INVASIVE LOC[facility];  Service: Cardiology;  Laterality: N/A;   • COLONOSCOPY     • KNEE MENISCAL REPAIR Left      Social History:  Social History     Socioeconomic History   • Marital status: Single   Tobacco Use   • Smoking status: Former Smoker     Packs/day: 1.50     Years: 43.00     Pack years: 64.50     Types: Cigarettes     Quit date: 2022     Years since quittin.0   • Smokeless tobacco: Never Used   Substance and Sexual Activity   • Alcohol use: Yes     Alcohol/week: 5.0 standard drinks     Types: 5 Cans of beer per week   • Drug use: Never   • Sexual activity: Defer     Allergies:  No Known Allergies  Immunizations:  Immunization History   Administered Date(s) Administered   • Flu Vaccine Intradermal Quad 18-64YR 10/05/2017, 2020            In-Office Procedure(s):  No orders to display        ASCVD RIsk Score::  The 10-year ASCVD risk score ([place] MIHAELA Jr., et al., 2013) is: 7.5%    Values used to calculate the score:      Age: 56 years      Sex: Male      Is Non- : No      Diabetic: No      Tobacco smoker: No      Systolic Blood Pressure: 130 mmHg      Is BP treated: Yes      HDL Cholesterol: 50 mg/dL      Total Cholesterol: 205 mg/dL    Imaging:    Results for orders placed in visit on 02/15/21    XR Chest 2 View    Narrative  Examination: XR CHEST 2 VW-    Date of Exam: 2/15/2021 8:43 AM    Indication: PRE OPS, JOINT PAIN; Z01.818-Encounter for other  preprocedural examination; M25.50-Pain in unspecified joint.  Preop left  knee arthroplasty. Previous smoking history.    Comparison: Radiograph 2015    Technique: 2 radiographic views of the chest were  obtained.    Findings:  There are no airspace consolidations. No pleural effusions. No  pneumothorax. The heart size is normal. The pulmonary vasculature  appears within normal limits. No acute osseous abnormality identified.    Impression  No acute cardiopulmonary process.    Electronically Signed By-Carmelina Riojas MD On:2/15/2021 9:08 AM  This report was finalized on [id number] by  Carmelina Riojas MD.       Results for orders placed in visit on 12/20/21    CT Chest With & Without Contrast      Results for orders placed in visit on 12/20/21    CT Chest With & Without Contrast      Lab Review:   Lab on 02/17/2022   Component Date Value   • COVID19 02/17/2022 Not Detected    Office Visit on 02/08/2022   Component Date Value   • Glucose 02/17/2022 105 (A)   • BUN 02/17/2022 13    • Creatinine 02/17/2022 0.83    • Sodium 02/17/2022 142    • Potassium 02/17/2022 4.5    • Chloride 02/17/2022 102    • CO2 02/17/2022 30.0 (A)   • Calcium 02/17/2022 9.5    • Total Protein 02/17/2022 6.9    • Albumin 02/17/2022 4.20    • ALT (SGPT) 02/17/2022 50 (A)   • AST (SGOT) 02/17/2022 29    • Alkaline Phosphatase 02/17/2022 97    • Total Bilirubin 02/17/2022 0.4    • eGFR Non  Amer 02/17/2022 96    • Globulin 02/17/2022 2.7    • A/G Ratio 02/17/2022 1.6    • BUN/Creatinine Ratio 02/17/2022 15.7    • Anion Gap 02/17/2022 10.0    • Protime 02/17/2022 10.5    • INR 02/17/2022 0.94    • WBC 02/17/2022 8.00    • RBC 02/17/2022 5.14    • Hemoglobin 02/17/2022 17.0    • Hematocrit 02/17/2022 48.9    • MCV 02/17/2022 95.2    • MCH 02/17/2022 33.1 (A)   • MCHC 02/17/2022 34.8    • RDW 02/17/2022 14.0    • RDW-SD 02/17/2022 47.3    • MPV 02/17/2022 7.3    • Platelets 02/17/2022 304    • Scan Slide 02/17/2022     • Neutrophil % 02/17/2022 50.0    • Lymphocyte % 02/17/2022 36.0    • Monocyte % 02/17/2022 6.0    • Eosinophil % 02/17/2022 6.0    • Atypical Lymphocyte % 02/17/2022 2.0    • Neutrophils Absolute 02/17/2022 4.00    • Lymphocytes  Absolute 02/17/2022 3.04    • Monocytes Absolute 02/17/2022 0.48    • Eosinophils Absolute 02/17/2022 0.48 (A)   • Poikilocytes 02/17/2022 Slight/1+    • WBC Morphology 02/17/2022 Normal    • Platelet Morphology 02/17/2022 Normal    Hospital Outpatient Visit on 12/30/2021   Component Date Value   • Target HR (85%) 12/30/2021 139    • Max. Pred. HR (100%) 12/30/2021 164    • BH CV STRESS PROTOCOL 1 12/30/2021 Gaurav    • Stage 1 12/30/2021 1    • HR Stage 1 12/30/2021 106    • BP Stage 1 12/30/2021 155/80    • Duration Min Stage 1 12/30/2021 3    • Duration Sec Stage 1 12/30/2021 0    • Grade Stage 1 12/30/2021 10    • Speed Stage 1 12/30/2021 1.7    • BH CV STRESS METS STAGE 1 12/30/2021 5    • Stage 2 12/30/2021 2    • HR Stage 2 12/30/2021 130    • BP Stage 2 12/30/2021 170/85    • Duration Min Stage 2 12/30/2021 3    • Duration Sec Stage 2 12/30/2021 0    • Grade Stage 2 12/30/2021 12    • Speed Stage 2 12/30/2021 2.5    • BH CV STRESS METS STAGE 2 12/30/2021 7.5    • Baseline HR 12/30/2021 79    • Baseline BP 12/30/2021 135/80    • Peak HR 12/30/2021 130    • Percent Max Pred HR 12/30/2021 79.27    • Percent Target HR 12/30/2021 93    • Peak BP 12/30/2021 170/85    • Recovery HR 12/30/2021 93    • Recovery BP 12/30/2021 135/75    • Nuclear Prior Study 12/30/2021 3    • BH CV REST NUCLEAR ISOTO* 12/30/2021 7.9    • BH CV STRESS NUCLEAR ISO* 12/30/2021 21.9    • Nuc Stress EF 12/30/2021 45    Hospital Outpatient Visit on 12/30/2021   Component Date Value   • BSA 12/30/2021 2.1    • RVIDd 12/30/2021 3.0    • IVSd 12/30/2021 1.0    • LVIDd 12/30/2021 5.4    • LVIDs 12/30/2021 4.3    • LVPWd 12/30/2021 1.1    • IVS/LVPW 12/30/2021 0.91    • FS 12/30/2021 21.2    • EDV(Teich) 12/30/2021 143.8    • ESV(Teich) 12/30/2021 82.5    • EF(Teich) 12/30/2021 42.6    • EDV(cubed) 12/30/2021 161.1    • ESV(cubed) 12/30/2021 78.8    • EF(cubed) 12/30/2021 51.1    • LV mass(C)d 12/30/2021 227.1    • LV mass(C)dI 12/30/2021 108.4     • SV(Teich) 12/30/2021 61.3    • SI(Teich) 12/30/2021 29.3    • SV(cubed) 12/30/2021 82.3    • SI(cubed) 12/30/2021 39.3    • Ao root diam 12/30/2021 3.7    • Ao root area 12/30/2021 10.9    • ACS 12/30/2021 2.5    • LVOT diam 12/30/2021 2.2    • LVOT area 12/30/2021 3.9    • RVOT diam 12/30/2021 2.6    • RVOT area 12/30/2021 5.4    • EDV(MOD-sp4) 12/30/2021 96.1    • ESV(MOD-sp4) 12/30/2021 42.3    • EF(MOD-sp4) 12/30/2021 56.0    • SV(MOD-sp4) 12/30/2021 53.8    • SI(MOD-sp4) 12/30/2021 25.7    • Ao root area (BSA correc* 12/30/2021 1.8    • LV Matute Vol (BSA correct* 12/30/2021 45.8    • LV Sys Vol (BSA correcte* 12/30/2021 20.2    • MV E max regine 12/30/2021 90.8    • MV A max regine 12/30/2021 79.8    • MV E/A 12/30/2021 1.1    • MV V2 max 12/30/2021 82.6    • MV max PG 12/30/2021 2.7    • MV V2 mean 12/30/2021 54.4    • MV mean PG 12/30/2021 1.3    • MV V2 VTI 12/30/2021 26.1    • MVA(VTI) 12/30/2021 2.8    • MV dec slope 12/30/2021 361.1    • MV dec time 12/30/2021 0.25    • Ao pk regine 12/30/2021 118.1    • Ao max PG 12/30/2021 5.6    • Ao max PG (full) 12/30/2021 2.5    • Ao V2 mean 12/30/2021 86.5    • Ao mean PG 12/30/2021 3.2    • Ao mean PG (full) 12/30/2021 1.8    • Ao V2 VTI 12/30/2021 27.1    • JANET(I,A) 12/30/2021 2.7    • JANET(I,D) 12/30/2021 2.7    • JANET(V,A) 12/30/2021 2.8    • JANET(V,D) 12/30/2021 2.8    • LV V1 max PG 12/30/2021 3.0    • LV V1 mean PG 12/30/2021 1.4    • LV V1 max 12/30/2021 85.7    • LV V1 mean 12/30/2021 53.8    • LV V1 VTI 12/30/2021 18.8    • SV(Ao) 12/30/2021 295.6    • SI(Ao) 12/30/2021 141.0    • SV(LVOT) 12/30/2021 73.5    • SV(RVOT) 12/30/2021 65.9    • SI(LVOT) 12/30/2021 35.1    • PA V2 max 12/30/2021 80.4    • PA max PG 12/30/2021 2.7    • PA max PG (full) 12/30/2021 1.5    • BH CV ECHO MICHAEL - PVA(V,* 12/30/2021 3.6    • BH CV ECHO MICHAEL - PVA(V,* 12/30/2021 3.6    • RV V1 max PG 12/30/2021 1.1    • RV V1 mean PG 12/30/2021 0.66    • RV V1 max 12/30/2021 53.1    • RV V1 mean  12/30/2021 39.3    • RV V1 VTI 12/30/2021 12.1    • Pulm Sys Fran 12/30/2021 64.1    • Pulm Matute Fran 12/30/2021 47.6    • Pulm S/D 12/30/2021 1.3    • Qp/Qs 12/30/2021 0.9    • Pulm A Revs Dur 12/30/2021 0.07    • Pulm A Revs Fran 12/30/2021 32.3    • BH CV ECHO MICHAEL - BZI_BMI 12/30/2021 29.0    • BH CV ECHO MICHAEL - BSA(HA* 12/30/2021 2.1    • BH CV ECHO MICHAEL - BZI_ME* 12/30/2021 91.6    • BH CV ECHO MICHAEL - BZI_ME* 12/30/2021 177.8    • EF(MOD-bp) 12/30/2021 56.0    • LA dimension(2D) 12/30/2021 3.5    Lab on 12/30/2021   Component Date Value   • Glucose 12/30/2021 89    • BUN 12/30/2021 9    • Creatinine 12/30/2021 0.76    • Sodium 12/30/2021 140    • Potassium 12/30/2021 4.7    • Chloride 12/30/2021 101    • CO2 12/30/2021 31.7 (A)   • Calcium 12/30/2021 9.5    • Total Protein 12/30/2021 7.1    • Albumin 12/30/2021 4.90    • ALT (SGPT) 12/30/2021 41    • AST (SGOT) 12/30/2021 23    • Alkaline Phosphatase 12/30/2021 96    • Total Bilirubin 12/30/2021 0.3    • eGFR Non  Amer 12/30/2021 106    • Globulin 12/30/2021 2.2    • A/G Ratio 12/30/2021 2.2    • BUN/Creatinine Ratio 12/30/2021 11.8    • Anion Gap 12/30/2021 7.3    • TSH 12/30/2021 1.690    • Hemoglobin A1C 12/30/2021 5.7 (A)   • Total Cholesterol 12/30/2021 205 (A)   • Triglycerides 12/30/2021 182 (A)   • HDL Cholesterol 12/30/2021 50    • LDL Cholesterol  12/30/2021 123 (A)   • VLDL Cholesterol 12/30/2021 32    • LDL/HDL Ratio 12/30/2021 2.37    • Sed Rate 12/30/2021 14    • C-Reactive Protein 12/30/2021 0.38    • WBC 12/30/2021 9.95    • RBC 12/30/2021 5.35    • Hemoglobin 12/30/2021 17.4    • Hematocrit 12/30/2021 50.6    • MCV 12/30/2021 94.6    • MCH 12/30/2021 32.5    • MCHC 12/30/2021 34.4    • RDW 12/30/2021 12.7    • RDW-SD 12/30/2021 43.5    • MPV 12/30/2021 9.7    • Platelets 12/30/2021 345    • Neutrophil % 12/30/2021 58.1    • Lymphocyte % 12/30/2021 28.0    • Monocyte % 12/30/2021 10.1    • Eosinophil % 12/30/2021 1.7    • Basophil %  12/30/2021 0.6    • Immature Grans % 12/30/2021 1.5 (A)   • Neutrophils, Absolute 12/30/2021 5.78    • Lymphocytes, Absolute 12/30/2021 2.79    • Monocytes, Absolute 12/30/2021 1.00 (A)   • Eosinophils, Absolute 12/30/2021 0.17    • Basophils, Absolute 12/30/2021 0.06    • Immature Grans, Absolute 12/30/2021 0.15 (A)   • nRBC 12/30/2021 0.1    • Color, UA 12/30/2021 Yellow    • Appearance, UA 12/30/2021 Clear    • pH, UA 12/30/2021 7.5    • Specific Dovray, UA 12/30/2021 1.012    • Glucose, UA 12/30/2021 Negative    • Ketones, UA 12/30/2021 Negative    • Bilirubin, UA 12/30/2021 Negative    • Blood, UA 12/30/2021 Negative    • Protein, UA 12/30/2021 Negative    • Leuk Esterase, UA 12/30/2021 Negative    • Nitrite, UA 12/30/2021 Negative    • Urobilinogen, UA 12/30/2021 0.2 E.U./dL    • RBC, UA 12/30/2021 None Seen    • WBC, UA 12/30/2021 0-2    • Bacteria, UA 12/30/2021 None Seen    • Squamous Epithelial Cell* 12/30/2021 None Seen    • Hyaline Casts, UA 12/30/2021 None Seen    • Methodology 12/30/2021 Manual Light Microscopy      Recent labs reviewed and interpreted for clinical significance and application            Level of Care:           Alpesh Rock MD  03/28/22  .

## 2022-05-16 ENCOUNTER — OFFICE VISIT (OUTPATIENT)
Dept: FAMILY MEDICINE CLINIC | Facility: CLINIC | Age: 57
End: 2022-05-16

## 2022-05-16 VITALS
TEMPERATURE: 98.2 F | WEIGHT: 212.4 LBS | HEIGHT: 70 IN | HEART RATE: 82 BPM | OXYGEN SATURATION: 95 % | BODY MASS INDEX: 30.41 KG/M2 | SYSTOLIC BLOOD PRESSURE: 144 MMHG | DIASTOLIC BLOOD PRESSURE: 93 MMHG

## 2022-05-16 DIAGNOSIS — I10 ESSENTIAL HYPERTENSION: Primary | ICD-10-CM

## 2022-05-16 DIAGNOSIS — E78.2 MIXED HYPERLIPIDEMIA: ICD-10-CM

## 2022-05-16 DIAGNOSIS — E66.09 CLASS 1 OBESITY DUE TO EXCESS CALORIES WITH SERIOUS COMORBIDITY AND BODY MASS INDEX (BMI) OF 30.0 TO 30.9 IN ADULT: ICD-10-CM

## 2022-05-16 PROBLEM — E66.811 CLASS 1 OBESITY DUE TO EXCESS CALORIES WITH SERIOUS COMORBIDITY AND BODY MASS INDEX (BMI) OF 30.0 TO 30.9 IN ADULT: Status: ACTIVE | Noted: 2022-05-16

## 2022-05-16 PROBLEM — J43.9 PULMONARY EMPHYSEMA (HCC): Status: ACTIVE | Noted: 2022-05-16

## 2022-05-16 PROBLEM — I25.10 NONOBSTRUCTIVE ATHEROSCLEROSIS OF CORONARY ARTERY: Status: ACTIVE | Noted: 2022-02-08

## 2022-05-16 PROCEDURE — 99214 OFFICE O/P EST MOD 30 MIN: CPT | Performed by: FAMILY MEDICINE

## 2022-05-16 NOTE — PROGRESS NOTES
Assessment and Plan     Problem List Items Addressed This Visit        Cardiac and Vasculature    Essential hypertension - Primary    Overview     BP not at goal, <140/90.  Encouraged low-Na+ diet & 150 min exercise/week.   Continue lisinopril 20 mg and Toprol 100 mg.  Patient to monitor ambulatory BP.  Monitoring renal function.           Mixed hyperlipidemia    Overview     Reviewed lipid panel, LDL goal, & 10-year ASCVD risk score.  Encouraged a diet rich in vegetables & 150 minutes of exercise weekly.  Patient to work on lifestyle modifications. Consider starting a statin.              Endocrine and Metabolic    Class 1 obesity due to excess calories with serious comorbidity and body mass index (BMI) of 30.0 to 30.9 in adult    Overview     Discussed health risks associated with obesity.  Encouraged 150 minutes of exercise weekly.               Return in about 4 months (around 9/16/2022) for Annual Physical Exam.  Patient's elevated BP was noted and discussed.  []   Patient will monitor BP and send numbers to me on [employer].  [x]   Patient will monitor BP and call in to office in two weeks.  []   Patient is under stress and we will monitor over time. No changes necessary at this time.  []  BP med changed.  []  BP on recheck was normal. No changes necessary.          Patient was given instructions and counseling regarding his condition or for health maintenance advice. Please see specific information pulled into the AVS if appropriate.        Titus is a 56 y.o. being seen today for  Establish Care   Subjective   History of the Present Illness     Obese  male with a past medical history of tobacco use and concurrent medical history of hypertension presents establish care.    Hypertensive in office. Has gained weight since he quit smoking.    Underwent left heart catheterization in February 2022 that revealed nonobstructive coronary artery disease.  Recommendations were made to quit smoking and  "continue antihypertensive therapy.     CT scan of lungs in January revealed emphysema. Denies any dyspnea, cough, or wheezing.    Social History  He  reports that he quit smoking about 2 months ago. His smoking use included cigarettes. He started smoking about 46 years ago. He has a 86.00 pack-year smoking history. He has never used smokeless tobacco. He reports current alcohol use of about 5.0 standard drinks of alcohol per week. He reports that he does not use drugs.  Objective   Vital Signs          Vitals:    05/16/22 1321   BP: 144/93   BP Location: Left arm   Patient Position: Sitting   Cuff Size: Adult   Pulse: 82   Temp: 98.2 °F (36.8 °C)   TempSrc: Infrared   SpO2: 95%   Weight: 96.3 kg (212 lb 6.4 oz)   Height: 177.8 cm (70\")     BP Readings from Last 1 Encounters:   05/16/22 144/93     Wt Readings from Last 3 Encounters:   05/16/22 96.3 kg (212 lb 6.4 oz)   03/08/22 93.5 kg (206 lb 3.2 oz)   02/17/22 89 kg (196 lb 3.4 oz)   Body mass index is 30.48 kg/m².     Physical Exam  Vitals reviewed.   Constitutional:       General: He is not in acute distress.     Appearance: Normal appearance. He is obese.   HENT:      Head: Normocephalic and atraumatic.   Cardiovascular:      Rate and Rhythm: Normal rate.      Heart sounds: Normal heart sounds.   Pulmonary:      Effort: Pulmonary effort is normal.      Breath sounds: Normal breath sounds.   Neurological:      Mental Status: He is alert and oriented to person, place, and time.   Psychiatric:         Mood and Affect: Mood normal.         Behavior: Behavior normal.               Manual Differential (02/17/2022 06:06)  Comprehensive Metabolic Panel (02/17/2022 06:06)  CBC & Differential (02/17/2022 06:06)  Lipid Panel (12/30/2021 10:13)  Hemoglobin A1c (12/30/2021 10:13)  The 10-year ASCVD risk score (Armida CHAIREZ Jr., et al., 2013) is: 9%    Values used to calculate the score:      Age: 56 years      Sex: Male      Is Non- : No      Diabetic: " No      Tobacco smoker: No      Systolic Blood Pressure: 144 mmHg      Is BP treated: Yes      HDL Cholesterol: 50 mg/dL      Total Cholesterol: 205 mg/dL

## 2022-07-22 RX ORDER — LISINOPRIL 20 MG/1
TABLET ORAL
Qty: 90 TABLET | Refills: 1 | Status: SHIPPED | OUTPATIENT
Start: 2022-07-22 | End: 2023-02-07 | Stop reason: SDUPTHER

## 2022-11-03 RX ORDER — METOPROLOL SUCCINATE 100 MG/1
100 TABLET, EXTENDED RELEASE ORAL DAILY
Qty: 90 TABLET | Refills: 1 | Status: SHIPPED | OUTPATIENT
Start: 2022-11-03 | End: 2022-11-07 | Stop reason: SDUPTHER

## 2022-11-07 ENCOUNTER — TELEPHONE (OUTPATIENT)
Dept: CARDIOLOGY | Facility: CLINIC | Age: 57
End: 2022-11-07

## 2022-11-07 RX ORDER — METOPROLOL SUCCINATE 100 MG/1
100 TABLET, EXTENDED RELEASE ORAL DAILY
Qty: 90 TABLET | Refills: 1 | Status: SHIPPED | OUTPATIENT
Start: 2022-11-07 | End: 2023-02-07 | Stop reason: SDUPTHER

## 2022-11-07 NOTE — TELEPHONE ENCOUNTER
Caller: Titus Jarvis    Relationship: Self    Best call back number: [phone]    Requested Prescriptions:   Requested Prescriptions      No prescriptions requested or ordered in this encounter        Pharmacy where request should be sent: [facility]      Additional details provided by patient:     Does the patient have less than a 3 day supply:  [] Yes  [x] No    Shannon Kelly Rep   11/07/22 11:19 EST

## 2022-12-09 ENCOUNTER — OFFICE VISIT (OUTPATIENT)
Dept: FAMILY MEDICINE CLINIC | Facility: CLINIC | Age: 57
End: 2022-12-09

## 2022-12-09 VITALS
SYSTOLIC BLOOD PRESSURE: 130 MMHG | BODY MASS INDEX: 30.75 KG/M2 | DIASTOLIC BLOOD PRESSURE: 78 MMHG | WEIGHT: 214.8 LBS | HEIGHT: 70 IN | OXYGEN SATURATION: 96 % | HEART RATE: 99 BPM

## 2022-12-09 DIAGNOSIS — I10 ESSENTIAL HYPERTENSION: ICD-10-CM

## 2022-12-09 DIAGNOSIS — K22.719 BARRETT'S ESOPHAGUS WITH DYSPLASIA: ICD-10-CM

## 2022-12-09 DIAGNOSIS — Z72.0 TOBACCO USE: Primary | ICD-10-CM

## 2022-12-09 PROCEDURE — 99213 OFFICE O/P EST LOW 20 MIN: CPT | Performed by: STUDENT IN AN ORGANIZED HEALTH CARE EDUCATION/TRAINING PROGRAM

## 2022-12-09 NOTE — PROGRESS NOTES
"Chief Complaint  Chief Complaint   Patient presents with   • Establish Care     Subjective        Titus Jarvis is a 57 y.o. male who presents to [facility].    History of Present Illness  Here to establish care.    Quit smoking Feb 2022.  Smoked for 40+ yrs about 1.5 - 2 ppd.    Sees GI for Saeed's, on PPI.  Had normal colonoscopy 8/2021.    Sees Dr. Rock with cardiology.  Had extensive workup completed earlier this year including stress test and cardiac cath.      Objective   /78   Pulse 99   Ht 177.8 cm (70\")   Wt 97.4 kg (214 lb 12.8 oz)   SpO2 96%   BMI 30.82 kg/m²     Estimated body mass index is 30.82 kg/m² as calculated from the following:    Height as of this encounter: 177.8 cm (70\").    Weight as of this encounter: 97.4 kg (214 lb 12.8 oz).     Physical Exam   GEN: In no acute distress  CV: Regular rate and rhythm, no murmurs  RESP: Lungs clear to auscultation anteriorly and posteriorly in all lung fields bilaterally.  MSK: Normal gait  PSYCH: Affect normal, insight fair      Result Review :    The following data was reviewed by: Lev Thompson DO on 12/09/2022:  CMP    CMP 12/30/21 2/17/22   Glucose 89 105 (A)   BUN 9 13   Creatinine 0.76 0.83   eGFR Non African Am 106 96   Sodium 140 142   Potassium 4.7 4.5   Chloride 101 102   Calcium 9.5 9.5   Albumin 4.90 4.20   Total Bilirubin 0.3 0.4   Alkaline Phosphatase 96 97   AST (SGOT) 23 29   ALT (SGPT) 41 50 (A)   (A) Abnormal value            CBC    CBC 12/30/21 2/17/22   WBC 9.95 8.00   RBC 5.35 5.14   Hemoglobin 17.4 17.0   Hematocrit 50.6 48.9   MCV 94.6 95.2   MCH 32.5 33.1 (A)   MCHC 34.4 34.8   RDW 12.7 14.0   Platelets 345 304   (A) Abnormal value            Lipid Panel    Lipid Panel 12/30/21   Total Cholesterol 205 (A)   Triglycerides 182 (A)   HDL Cholesterol 50   VLDL Cholesterol 32   LDL Cholesterol  123 (A)   LDL/HDL Ratio 2.37   (A) Abnormal value            TSH    TSH 12/30/21 "   TSH 1.690           Data reviewed: Radiologic studies chest CT from January of this year showing nodular groundglass opacities, scarring of lung, emphysema      Assessment and Plan     Diagnoses and all orders for this visit:    1. Tobacco use (Primary)  > 20 pack years, quit < 1 yr ago.  Had CT scan January that showed nodular groundglass opacities, scarring of lung, emphysema.  Needs yearly low dose chest CT screening so will order.  Encouraged continued cessation.      2. Essential hypertension  At goal today < 140/90, continue lisinopril 20 mg daily    3. Saeed's esophagus with dysplasia  Continue PPI and f/u with GI       Follow Up     Return in about 1 year (around 12/9/2023) for Annual physical.

## 2022-12-11 ENCOUNTER — PATIENT ROUNDING (BHMG ONLY) (OUTPATIENT)
Dept: FAMILY MEDICINE CLINIC | Facility: CLINIC | Age: 57
End: 2022-12-11

## 2022-12-12 NOTE — PROGRESS NOTES
December 11, 2022    Grid2020 message sent to Titus [place]  [facility]  [address]  [place] [id number].

## 2023-02-07 ENCOUNTER — TELEPHONE (OUTPATIENT)
Dept: CARDIOLOGY | Facility: CLINIC | Age: 58
End: 2023-02-07
Payer: COMMERCIAL

## 2023-02-07 RX ORDER — METOPROLOL SUCCINATE 100 MG/1
100 TABLET, EXTENDED RELEASE ORAL DAILY
Qty: 90 TABLET | Refills: 1 | Status: SHIPPED | OUTPATIENT
Start: 2023-02-07

## 2023-02-07 RX ORDER — LISINOPRIL 20 MG/1
20 TABLET ORAL DAILY
Qty: 90 TABLET | Refills: 1 | Status: SHIPPED | OUTPATIENT
Start: 2023-02-07 | End: 2023-03-07

## 2023-03-07 ENCOUNTER — OFFICE VISIT (OUTPATIENT)
Dept: CARDIOLOGY | Facility: CLINIC | Age: 58
End: 2023-03-07
Payer: COMMERCIAL

## 2023-03-07 VITALS
BODY MASS INDEX: 31.07 KG/M2 | HEART RATE: 84 BPM | RESPIRATION RATE: 18 BRPM | SYSTOLIC BLOOD PRESSURE: 131 MMHG | DIASTOLIC BLOOD PRESSURE: 82 MMHG | HEIGHT: 70 IN | WEIGHT: 217 LBS

## 2023-03-07 DIAGNOSIS — R94.39 ABNORMAL STRESS TEST: ICD-10-CM

## 2023-03-07 DIAGNOSIS — R07.89 CHEST DISCOMFORT: ICD-10-CM

## 2023-03-07 DIAGNOSIS — Z76.89 ENCOUNTER TO ESTABLISH CARE: Primary | ICD-10-CM

## 2023-03-07 DIAGNOSIS — R06.09 DOE (DYSPNEA ON EXERTION): ICD-10-CM

## 2023-03-07 DIAGNOSIS — Z13.220 SCREENING FOR CHOLESTEROL LEVEL: ICD-10-CM

## 2023-03-07 DIAGNOSIS — I10 ESSENTIAL HYPERTENSION: ICD-10-CM

## 2023-03-07 DIAGNOSIS — I42.0 CARDIOMYOPATHY, DILATED: ICD-10-CM

## 2023-03-07 PROCEDURE — 99214 OFFICE O/P EST MOD 30 MIN: CPT | Performed by: INTERNAL MEDICINE

## 2023-03-07 RX ORDER — LOSARTAN POTASSIUM 50 MG/1
50 TABLET ORAL DAILY
Qty: 90 TABLET | Refills: 3 | Status: SHIPPED | OUTPATIENT
Start: 2023-03-07

## 2023-03-07 NOTE — PROGRESS NOTES
Cardiology Clinic Note  Alpesh Rock MD, PhD    Subjective:     Encounter Date:03/07/2023      Patient ID: Titus Jarvis is a 57 y.o. male.    Chief Complaint:  Chief Complaint   Patient presents with   • Follow-up       HPI:       I the pleasure to see this very pleasant 57-year-old gentleman is a new patient.  He has a cardiac history of cardiomyopathy historically with a EF of 45% with dilated cardiomyopathy with nonobstructive CAD on heart cath 18 years ago.  Continues to smoke was counseled to quit but has not quit yet but has cut down substantially since.  CT scan of the chest reveals emphysema and inflammatory changes with diffuse nodular patterns..  He still says he been feeling worse with much worsening shortness of breath.  On my prior and current exam I do hear wheezing as well as bronchial breath sounds likely concerning for advanced COPD or emphysema.  He has no lower extremity edema no near syncope or palpitations but reported that previously he is short of breath with even minimal activity.  No other palpitations PND orthopnea or other complaints no recent fevers chill sweats nausea vomiting or diarrhea, he denies any chest pain but says he does get some heaviness occasional.  His echo demonstrated EF of 55% with some abnormal septal motion concerning for localized inferoseptal hypokinesis, stress test revealed markedly diminished counts in the inferior and inferoseptal wall concerning for inferoseptal infarct versus attenuation artifact with summed difference score of only 3 but summed stress score of 17.  In the combination of symptoms with abnormal stress test, incomplete visualization of the inferior wall inferoseptal wall with nonobstructive CAD greater than 10 years ago and significant risk factors with smoking hypertension and others as well as symptoms of chest discomfort and shortness of breath patient likely appropriate for invasive ischemic evaluation for definitive evaluation  which she is amenable for after informed consent all risks and benefits were discussed.  Ultimately he underwent left heart catheterization demonstrating left main with no disease, no angiographic disease of the LAD system, no angiographic disease of the circumflex system and 30% mild luminal regularities in the right with widely patent PDA and PLV with normal EF but slightly high LVEDP at 15-18 mmHg with gentle diuresis recommended is also likely treatment for microvascular dysfunction.  We did discuss the emphysematous changes that are seen on CT with evidence of lung damage from smoking, voiced understanding, has been smoking for over 40 years, no new angina, referred to pulmonary for treatment.  He has had a cough with ACE inhibitor on lisinopril requesting change to alternative agent such as losartan.  He has been off cigarettes and smoking for over a year successfully.  No other chest pain unexplained syncope palpitations or other complaints.     Review of systems otherwise negative x14 point review of systems except as mentioned above      Vitals reviewed below  Regular rate and rhythm no rubs gallops heave or lift  1 out of 6 stock murmur stable  Radial pulses 2+ equal bilaterally  No clubbing cyanosis or edema  Normal cap refill  Mild expiratory wheezing on exam  Intact grossly  Soft nontender nondistended  Unchanged from prior     Historical data copied forward from previous encounters in EMR including the history, exam, and assessment/plan has been reviewed and is unchanged unless noted otherwise.     Cardiac medicines reviewed with risk, benefits, and necessity of each discussed.     Risk and benefit of cardiac testing reviewed including death heart attack stroke pain bleeding infection need for vascular /cardiovascular surgery were discussed and the patient        Assessment and plan per my encounter     Atypical chest pain stable  Shortness of breath dyspnea on exertion: Stable  Abnormal stress  "test  Abnormal CT with emphysematous changes  High filling pressures with mild diastolic dysfunction  Normal coronaries with nonobstructive CAD most severely 30% RCA  Essential hypertension    Medication-induced cough with lisinopril, changed to losartan 50 daily     Abnormal CT scan demonstrating emphysema, smoking cessation again recommended at length, made referral to pulmonology, may need inhalers or bronchodilator therapy with or without steroid and inhaled fashion  Continue present medicines     Primary prevention goals  Continue aspirin beta-blocker ACE inhibitor, blood pressures are controlled  Diet and exercise per HI guidelines  Smoking cessation     Refer to primary care  Refill medicines  Smoking cessation counseling provided no quit date established     Further recommendation to follow findings of invasive work-up     Alpesh Rock MD, PhD      Historical data copied forward from previous encounters in EMR including the history, exam, and assessment/plan has been reviewed and is unchanged unless noted otherwise.    Cardiac medicines reviewed with risk, benefits, and necessity of each discussed.    Risk and benefit of cardiac testing reviewed including death heart attack stroke pain bleeding infection need for vascular /cardiovascular surgery were discussed and the patient     Objective:         /82 (BP Location: Left arm, Patient Position: Sitting)   Pulse 84   Resp 18   Ht 177.8 cm (70\")   Wt 98.4 kg (217 lb)   BMI 31.14 kg/m²         The pleasure to be involved in this patient's cardiovascular care.  Please call with any questions or concerns  Alpesh Rock MD, PhD    Most recent EKG as reviewed and interpreted by me:  Procedures     Most recent echo as reviewed and interpreted by me:  Results for orders placed during the hospital encounter of 12/30/21    Adult Transthoracic Echo Complete W/ Cont if Necessary Per Protocol    Interpretation Summary  · Left ventricular systolic function is " low normal.  · Left ventricular ejection fraction is 55%  · Left ventricular diastolic function was normal.  · Mild hypokinesis of septum is noted      Most recent stress test as reviewed and interpreted by me:  Results for orders placed during the hospital encounter of 12/30/21    Stress Test With Myocardial Perfusion One Day    Interpretation Summary  · Left ventricular ejection fraction is mildly reduced. (Calculated EF = 45%).  · Impressions are consistent with an intermediate risk study.  · Inferior wall has fixed perfusion abnormality with summed rest score of 17 Summed rest score of 14 with no significant reversibility or gavin-infarct ischemia seen EF mildly reduced globally at 45% by gated imaging Summed difference score of 0 Cannot rule out significant diaphragmatic or GI attenuation as responsible for decreased counts in the inferior wall, overall imaging is improved with stress compared to resting images.  · Findings consistent with an equivocal ECG stress test.    Stress ECG did not reach target heart rate  No changes at submaximal stress  No arrhythmias seen  No chest pain reported  Nuclear imaging with abnormal perfusion in stress in the inferior and apical inferolateral wall which is fixed compared to resting images actually improved with stress overall  Summed stress score 14, summed rest score of 17 with summed difference score of 0  No gavin-infarct ischemia  Cannot rule out inferior infarct versus significant diaphragmatic or GI attenuation, no reversibility seen  Overall EF mildly reduced globally at 45%  Intermediate risk study by criteria      Most recent cardiac catheterization as reviewed interpreted by me:  Results for orders placed during the hospital encounter of 02/17/22    Cardiac Catheterization/Vascular Study    Narrative   Alpesh Rock MD, PhD  [facility] cardiology  Date of service 2-17-22    Procedure  1.  Left heart catheterization with coronary angiography  left ventriculography in ARIZMENDI position    Indication  Unexplained dyspnea on exertion, abnormal stress test    After informed consent patient was brought to the Cath Lab sterilely prepped and draped in usual fashion expose the right wrist for right radial access via micropuncture modified center technique with placement of 5/6 slender sheath under ultrasound guidance which was aspirated flushed with heparinized saline.  Standard cocktail of heparin nitroglycerin and Cardene was administered via the sheath followed by a 3 5 guidewire which of the aortic valve with diagnostic 5 Kiswahili JL 3 and JR4 catheters for selective left and right coronary angiography.  Angiography was performed with no obstructive CAD in the left system with essentially smooth contour of the coronary system.  Right had nonobstructive CAD most severely 30% in the midportion but widely patent vessels with ARTEMIO-3 flow throughout.  LV gram demonstrated normal EF however waveforms in the LV demonstrated elevated filling pressures EDP of 18 likely some degree of diastolic dysfunction.  All catheters equipment removed the sheath flushed with heparinized saline which was ultimately removed for placement of a TR band with immediate hemostasis.  There were no complications patient left the Cath Lab chest pain-free hemodynamically electrically stable alert talking to staff neurologically grossly intact bilaterally    Complications none  Blood loss less than 5 cc  Contrast usage 85 cc for the totality of the procedure  Moderate conscious sedation time of 30 minutes with IV Versed and fentanyl administered by registered nurse with complete ECG pulse oximetry and hemodynamic monitoring throughout the entirety the case observed by me    Findings  1.  Open aortic pressure of 105/62  2.  Closing pressure was unchanged  3.  Elevated EDP at 18  4.  No transaortic valve or LVOT gradient seen  5.  LV function low normal 50%    Angiography  1 left main large-caliber  vessel with no angiographic disease  2.  LAD has 1 diagonal branch which essentially has no angiographic disease throughout the LAD system as well as numerous septal perforators, the LAD does reach the apex  3 the circumflex is nondominant with an obtuse marginal branch has no angiographic disease  4.  RCA is a large caliber dominant vessel with PLV and PDA branches distally.  There is mid 30% lumen regularities with ARTEMIO-3 flow distally and no disease of the PDA L PLV segment significantly with only lumen regularities.    Conclusions and recommendations  1 nonobstructive CAD most severely 30% the RCA otherwise left side has minimal luminal regularities, low normal EF 50%, elevated EDP concerning for diastolic dysfunction with EDP of 18  2.  Shortness of breath likely explained by heart filling pressures as well as COPD and emphysema changes that are seen on CT.  Patient advised to quit smoking.  Gentle diuresis to decrease filling pressures as well as afterload reduction if indicated.    Patient be discharged and followed up in cardiology clinic for further recommendations titration of medical therapy    Patient be discharged safely today Home    Alpesh Rock MD, PhD    The following portions of the patient's history were reviewed and updated as appropriate: allergies, current medications, past family history, past medical history, past social history, past surgical history and problem list.      ROS:  14 point review of systems negative except as mentioned above    Current Outpatient Medications:   •  metoprolol succinate XL (TOPROL-XL) 100 MG 24 hr tablet, Take 1 tablet by mouth Daily., Disp: 90 tablet, Rfl: 1  •  pantoprazole (PROTONIX) 40 MG EC tablet, Take 1 tablet by mouth Daily., Disp: , Rfl:   •  losartan (Cozaar) 50 MG tablet, Take 1 tablet by mouth Daily., Disp: 90 tablet, Rfl: 3    Problem List:  Patient Active Problem List   Diagnosis   • Saeed's esophagus   • Cardiomyopathy, dilated (HCC)   •  Personal history of tobacco use, presenting hazards to health   • Essential hypertension   • Nonobstructive atherosclerosis of coronary artery   • Abnormal stress test   • Pulmonary emphysema (HCC)   • Mixed hyperlipidemia   • Class 1 obesity due to excess calories with serious comorbidity and body mass index (BMI) of 30.0 to 30.9 in adult     Past Medical History:  Past Medical History:   Diagnosis Date   • Cardiomyopathy, dilated (HCC) 2019   • Essential hypertension 2019     Past Surgical History:  Past Surgical History:   Procedure Laterality Date   • CARDIAC CATHETERIZATION     • CARDIAC CATHETERIZATION N/A 2022    Procedure: Left Heart Cath; Right Radial;  Surgeon: Alpesh Rock MD;  Location: [facility] CATH INVASIVE LOCATION;  Service: Cardiology;  Laterality: N/A;   • COLONOSCOPY     • KNEE MENISCAL REPAIR Left      Social History:  Social History     Socioeconomic History   • Marital status: Single   Tobacco Use   • Smoking status: Former     Packs/day: 2.00     Years: 43.00     Pack years: 86.00     Types: Cigarettes     Start date:      Quit date: 2022     Years since quittin.0     Passive exposure: Past   • Smokeless tobacco: Never   Vaping Use   • Vaping Use: Every day   • Substances: Nicotine   Substance and Sexual Activity   • Alcohol use: Yes     Alcohol/week: 5.0 standard drinks     Types: 5 Cans of beer per week   • Drug use: Never   • Sexual activity: Defer     Allergies:  No Known Allergies  Immunizations:  Immunization History   Administered Date(s) Administered   • Flu Vaccine Intradermal Quad 18-64YR 10/05/2017, 2020   • FluLaval/Fluzone >6mos 2020            In-Office Procedure(s):  No orders to display        ASCVD RIsk Score::  The 10-year ASCVD risk score (Олег DK, et al., 2019) is: 8.3%    Values used to calculate the score:      Age: 57 years      Sex: Male      Is Non- : No      Diabetic: No      Tobacco smoker:  No      Systolic Blood Pressure: 131 mmHg      Is BP treated: Yes      HDL Cholesterol: 50 mg/dL      Total Cholesterol: 205 mg/dL    Imaging:    Results for orders placed in visit on 02/15/21    XR Chest 2 View    Narrative  Examination: XR CHEST 2 VW-    Date of Exam: 2/15/2021 8:43 AM    Indication: PRE OPS, JOINT PAIN; Z01.818-Encounter for other  preprocedural examination; M25.50-Pain in unspecified joint.  Preop left  knee arthroplasty. Previous smoking history.    Comparison: Radiograph 03/25/2015    Technique: 2 radiographic views of the chest were obtained.    Findings:  There are no airspace consolidations. No pleural effusions. No  pneumothorax. The heart size is normal. The pulmonary vasculature  appears within normal limits. No acute osseous abnormality identified.    Impression  No acute cardiopulmonary process.    Electronically Signed By-Carmelina Riojas MD On:2/15/2021 9:08 AM  This report was finalized on [id number] by  Carmelina Riojas MD.       Results for orders placed in visit on 12/20/21    CT Chest With & Without Contrast      Results for orders placed in visit on 12/20/21    CT Chest With & Without Contrast      Lab Review:   No visits with results within 6 Month(s) from this visit.   Latest known visit with results is:   Lab on 02/17/2022   Component Date Value   • COVID19 02/17/2022 Not Detected      Recent labs reviewed and interpreted for clinical significance and application            Level of Care:           Alpesh Rock MD  03/07/23  .

## 2023-07-26 RX ORDER — METOPROLOL SUCCINATE 100 MG/1
100 TABLET, EXTENDED RELEASE ORAL DAILY
Qty: 90 TABLET | Refills: 1 | Status: SHIPPED | OUTPATIENT
Start: 2023-07-26

## 2023-08-24 ENCOUNTER — TELEPHONE (OUTPATIENT)
Dept: FAMILY MEDICINE CLINIC | Facility: CLINIC | Age: 58
End: 2023-08-24
Payer: COMMERCIAL

## 2023-08-24 NOTE — TELEPHONE ENCOUNTER
Patient is requesting a medication for Gout. Patient states he has gout in his Lt foot.   He has not currently been prescribed a medication for gout in the past.   If approved, patient would like medication sent to [address]     Patient informed he has not been seen since December and since this is a new medication request he would more than likely need to schedule an appointment to be seen and discuss.     Patient wanted to move forward with phone note request first before scheduling an appt.

## 2023-08-29 ENCOUNTER — OFFICE VISIT (OUTPATIENT)
Dept: FAMILY MEDICINE CLINIC | Facility: CLINIC | Age: 58
End: 2023-08-29
Payer: COMMERCIAL

## 2023-08-29 VITALS
WEIGHT: 220 LBS | RESPIRATION RATE: 18 BRPM | DIASTOLIC BLOOD PRESSURE: 82 MMHG | SYSTOLIC BLOOD PRESSURE: 145 MMHG | BODY MASS INDEX: 31.5 KG/M2 | OXYGEN SATURATION: 96 % | HEIGHT: 70 IN | HEART RATE: 75 BPM

## 2023-08-29 DIAGNOSIS — M1A.0720 CHRONIC GOUT OF LEFT FOOT, UNSPECIFIED CAUSE: Primary | ICD-10-CM

## 2023-08-29 DIAGNOSIS — Z87.891 SMOKING HISTORY: ICD-10-CM

## 2023-08-29 PROCEDURE — 99214 OFFICE O/P EST MOD 30 MIN: CPT | Performed by: STUDENT IN AN ORGANIZED HEALTH CARE EDUCATION/TRAINING PROGRAM

## 2023-08-29 RX ORDER — ALLOPURINOL 100 MG/1
100 TABLET ORAL DAILY
Qty: 90 TABLET | Refills: 1 | Status: SHIPPED | OUTPATIENT
Start: 2023-08-29

## 2023-08-29 RX ORDER — COLCHICINE 0.6 MG/1
TABLET ORAL
Qty: 20 TABLET | Refills: 0 | Status: SHIPPED | OUTPATIENT
Start: 2023-08-29

## 2023-08-29 NOTE — PROGRESS NOTES
"Chief Complaint  Chief Complaint   Patient presents with    Gout     Subjective        Titus Jarvis is a 58 y.o. male who presents to [facility].    History of Present Illness  Seen in urgent care on 8/25 for acute gout flare.  He was given Solu-Medrol injection and started on indomethacin.  He is much improved.  It was his L foot, first few toes.  He has cut back on his alcohol intake and red meat.  His dad and brother were previously on allopurinol d/t recurrent gout.  He typically has 1-2 gout flares per year but they seem to be increasing in frequency.      Smoking history, ordered low dose chest CT for lung cancer but he was never called about it.  > 80 pack yrs, quit last year.      Objective   /82   Pulse 75   Resp 18   Ht 177.8 cm (70\")   Wt 99.8 kg (220 lb)   SpO2 96%   BMI 31.57 kg/mý     Estimated body mass index is 31.57 kg/mý as calculated from the following:    Height as of this encounter: 177.8 cm (70\").    Weight as of this encounter: 99.8 kg (220 lb).     Physical Exam   GEN: In no acute distress, non toxic appearing  MSK: L foot without erythema, swelling.  Normal gait / ROM.      PHQ-2 Depression Screening  Little interest or pleasure in doing things? 0-->not at all   Feeling down, depressed, or hopeless? 0-->not at all   PHQ-2 Total Score 0      Result Review :              Assessment and Plan     Diagnoses and all orders for this visit:    1. Chronic gout of left foot, unspecified cause (Primary)  Gout flare is increasing in frequency, we will start allopurinol 100 mg daily, plan to check uric acid levels at his physical in December.  Goal uric acid less than 6.  We will give colchicine for him to have at home should he have another flare.  Discussed that he will take 2 on day 1 then 1 a day until his symptoms improve.  -     colchicine 0.6 MG tablet; Take 2 on day 1 of flare, then 1 a day until symptoms improve  Dispense: 20 tablet; Refill: " 0  -     allopurinol (Zyloprim) 100 MG tablet; Take 1 tablet by mouth Daily.  Dispense: 90 tablet; Refill: 1    2. Smoking history  Significant smoking history, lung cancer screening with low-dose chest CT recommended.  Reordered.  If he does not hear from them within 1 week to get scheduled he will let us know and we will give him the number to call.  -      CT Chest Low Dose Cancer Screening WO; Future      Follow Up   Has appointment scheduled in December already.

## 2023-09-27 ENCOUNTER — TELEPHONE (OUTPATIENT)
Dept: CARDIOLOGY | Facility: CLINIC | Age: 58
End: 2023-09-27
Payer: COMMERCIAL

## 2023-09-27 NOTE — TELEPHONE ENCOUNTER
Caller: Titus Jarvis    Relationship to patient: Self    Best call back number: [phone]    Chief complaint: HIGH BLOOD PRESSURE    Type of visit: FOLLOW UP     Requested date: AS SOON AS POSSIBLE     If rescheduling, when is the original appointment: 03.11.2024     Additional notes:PATIENT STATED THAT HE HAD HIS BLOOD PRESSURE CHECKED AT WORK AND IT /102 THIS MORNING. PATIENT STATED THAT IT HAS BEEN RUNNING A LITTLE HIGH FOR A FEW MONTHS SINCE MEDICATION CHANGE. PATIENT FEELS HE SHOULD SEE DR. BURRELL. PLEASE PATIENT TO SCHEDULE. THANK YOU.

## 2023-09-28 ENCOUNTER — OFFICE VISIT (OUTPATIENT)
Dept: CARDIOLOGY | Facility: CLINIC | Age: 58
End: 2023-09-28
Payer: COMMERCIAL

## 2023-09-28 VITALS
BODY MASS INDEX: 31.92 KG/M2 | WEIGHT: 223 LBS | HEART RATE: 80 BPM | OXYGEN SATURATION: 97 % | RESPIRATION RATE: 18 BRPM | HEIGHT: 70 IN | SYSTOLIC BLOOD PRESSURE: 154 MMHG | DIASTOLIC BLOOD PRESSURE: 86 MMHG

## 2023-09-28 DIAGNOSIS — I10 ESSENTIAL HYPERTENSION: ICD-10-CM

## 2023-09-28 DIAGNOSIS — I25.10 NONOBSTRUCTIVE ATHEROSCLEROSIS OF CORONARY ARTERY: ICD-10-CM

## 2023-09-28 DIAGNOSIS — E78.2 MIXED HYPERLIPIDEMIA: ICD-10-CM

## 2023-09-28 DIAGNOSIS — Z09 FOLLOW-UP EXAM: Primary | ICD-10-CM

## 2023-09-28 DIAGNOSIS — I42.0 CARDIOMYOPATHY, DILATED: ICD-10-CM

## 2023-09-28 RX ORDER — LISINOPRIL 20 MG/1
20 TABLET ORAL DAILY
Qty: 30 TABLET | Refills: 2 | Status: SHIPPED | OUTPATIENT
Start: 2023-09-28 | End: 2023-12-27

## 2023-10-02 RX ORDER — METOPROLOL SUCCINATE 100 MG/1
TABLET, EXTENDED RELEASE ORAL
Qty: 90 TABLET | Refills: 1 | Status: SHIPPED | OUTPATIENT
Start: 2023-10-02

## 2023-10-03 ENCOUNTER — TELEPHONE (OUTPATIENT)
Dept: CARDIOLOGY | Facility: CLINIC | Age: 58
End: 2023-10-03

## 2023-10-03 NOTE — TELEPHONE ENCOUNTER
Caller: [facility] - [place], [place] - [address] - [phone] [facility] [phone] FX    Relationship: Pharmacy    Best call back number: [phone]  OPTION 2    What is the best time to reach you: ANYTIME    Who are you requesting to speak with (clinical staff, provider,  specific staff member): CLINICAL    Do you know the name of the person who called: NA    What was the call regarding: PT WAS SEEN ON 09.28.2023 AND WAS TAKING LOSARTAN - PT WAS SWITCHED TO LISINOPRIL AND ASKED TO STOP LOSARTAN - PHARMACY NEEDING TO CONFIRM AND SPEAK WITH SOMEONE CLINICAL TO AUTHORIZE AND PHARMACY STATES PRESCRIPTION WILL BE ON HOLD UNTIL THEY HEAR BACK     REFERENCE # : [id number]    Is it okay if the provider responds through [employer]: NO CALL BACK PLEASE

## 2023-10-19 NOTE — PROGRESS NOTES
Cardiology Office Follow Up Visit      Primary Care Provider:  Lev Thompson DO    Reason for f/u:     Follow up, HTN      Subjective     CC:    Follow up, HTN    History of Present Illness       Titus Jarvis is a 58 y.o. male who is a patient of Dr. Rock. Pmh includes former smoker, emphysematous changes on CT scan, HTN, cardiomyopathy historically with a EF of 45% with dilated cardiomyopathy at one time with most recent ECHO showing  EF of 55% with some abnormal septal motion concerning for localized inferoseptal hypokinesis in 2021, stress test revealed markedly diminished counts in the inferior and inferoseptal wall concerning for inferoseptal infarct versus attenuation artifact with summed difference score of only 3 but summed stress score of 17. 2/2022 left heart catheterization demonstrating left main with no disease, no angiographic disease of the LAD system, no angiographic disease of the circumflex system and 30% mild luminal regularities in the right with widely patent PDA and PLV with normal EF but slightly high LVEDP at 15-18 mmHg with gentle diuresis recommended is also likely treatment for microvascular dysfunction.      Mr. Jarvis previously was changed from lisinopril to losartan. He had a slight cough. He presents today for follow up and concerns about his blood pressures. He states he had a wellness check at work and his blood pressure was high. It is 154/86 today. At work it was 161/102. He states since he started his lostartan and stopped lisinopril in March it has been high but was never that high on lisinopril. He is requesting to go back on lisinopril. He denies chest pain or shortness of breath.         ASSESSMENT/PLAN:      Diagnoses and all orders for this visit:    1. Follow-up exam (Primary)    2. Essential hypertension    3. Cardiomyopathy, dilated    4. Nonobstructive atherosclerosis of coronary artery    5. Mixed hyperlipidemia    Other orders  -     lisinopril  (PRINIVIL,ZESTRIL) 20 MG tablet; Take 1 tablet by mouth Daily for 90 days.  Dispense: 30 tablet; Refill: 2        MEDICAL DECISION MAKING:  Mr. Jarvis presents with concerns over high blood pressures. He is requesting to go back on his lisnopril and stop losartan. He denies angina or shortness of breath. We discussed it was changed in March appearing secondary to a cough. He states cough was not that bad and he would like to go back on that medication. He has a f/u appointment in march. He will notify us of b/p and has been instructed to keep a b/p log.         Past Medical History:   Diagnosis Date    Cardiomyopathy, dilated 2019    Essential hypertension 2019       Past Surgical History:   Procedure Laterality Date    CARDIAC CATHETERIZATION      CARDIAC CATHETERIZATION N/A 2022    Procedure: Left Heart Cath; Right Radial;  Surgeon: Alpesh Rock MD;  Location: [facility] CATH INVASIVE LOCATION;  Service: Cardiology;  Laterality: N/A;    COLONOSCOPY      KNEE MENISCAL REPAIR Left          Current Outpatient Medications:     allopurinol (Zyloprim) 100 MG tablet, Take 1 tablet by mouth Daily., Disp: 90 tablet, Rfl: 1    colchicine 0.6 MG tablet, Take 2 on day 1 of flare, then 1 a day until symptoms improve, Disp: 20 tablet, Rfl: 0    pantoprazole (PROTONIX) 40 MG EC tablet, Take 1 tablet by mouth Daily., Disp: , Rfl:     lisinopril (PRINIVIL,ZESTRIL) 20 MG tablet, Take 1 tablet by mouth Daily for 90 days., Disp: 30 tablet, Rfl: 2    metoprolol succinate XL (TOPROL-XL) 100 MG 24 hr tablet, TAKE 1 TABLET DAILY, Disp: 90 tablet, Rfl: 1    Social History     Socioeconomic History    Marital status: Single   Tobacco Use    Smoking status: Former     Packs/day: 2.00     Years: 43.00     Additional pack years: 0.00     Total pack years: 86.00     Types: Cigarettes     Start date: 1976     Quit date: 2022     Years since quittin.6     Passive exposure: Past    Smokeless tobacco: Never  "   Tobacco comments:     none since 2-   Vaping Use    Vaping Use: Some days    Substances: Nicotine   Substance and Sexual Activity    Alcohol use: Yes     Alcohol/week: 5.0 standard drinks of alcohol     Types: 5 Cans of beer per week    Drug use: Never    Sexual activity: Yes     Partners: Female     Birth control/protection: Condom       History reviewed. No pertinent family history.    The following portions of the patient's history were reviewed and updated as appropriate: allergies, current medications, past family history, past medical history, past social history, past surgical history and problem list.    Review of Systems   Constitutional: Negative for chills, diaphoresis and malaise/fatigue.   Cardiovascular:  Negative for chest pain, dyspnea on exertion, irregular heartbeat, leg swelling, near-syncope, orthopnea, palpitations, paroxysmal nocturnal dyspnea and syncope.   Respiratory:  Negative for cough, shortness of breath, sleep disturbances due to breathing and sputum production.    Gastrointestinal:  Negative for change in bowel habit.   Genitourinary:  Negative for urgency.   Neurological:  Negative for dizziness and headaches.   Psychiatric/Behavioral:  Negative for altered mental status.        Pertinent items are noted in HPI, all other systems reviewed and negative    /86 (BP Location: Left arm, Patient Position: Sitting, Cuff Size: Large Adult)   Pulse 80   Resp 18   Ht 177.8 cm (70\")   Wt 101 kg (223 lb)   SpO2 97%   BMI 32.00 kg/m² .  Objective     Constitutional:       Appearance: Not in distress.   Neck:      Vascular: JVD normal.   Pulmonary:      Effort: Pulmonary effort is normal.      Breath sounds: Normal breath sounds.   Cardiovascular:      Normal rate. Regular rhythm.   Pulses:     Intact distal pulses.   Edema:     Peripheral edema absent.   Abdominal:      General: Bowel sounds are normal.      Palpations: Abdomen is soft.   Musculoskeletal: Normal range of " motion. Skin:     General: Skin is warm and dry.   Neurological:      General: No focal deficit present.      Mental Status: Oriented to person, place and time.             ECG 12 Lead    Date/Time: 9/28/2023 2:01 PM  Performed by: Katy Allen APRN    Authorized by: Katy Allen APRN  Comparison: not compared with previous ECG   Previous ECG: no previous ECG available  Rhythm: sinus rhythm  Rate: normal  BPM: 8  Q waves: V2 and V1            EKG ordered by and reviewed by me in office             Titus Jarvis  reports that he quit smoking about 19 months ago. His smoking use included cigarettes. He started smoking about 47 years ago. He has a 86.00 pack-year smoking history. He has been exposed to tobacco smoke. He has never used smokeless tobacco.. I have educated him on the risk of diseases from using tobacco products such as cancer, COPD, and heart disease.

## 2023-12-01 ENCOUNTER — HOSPITAL ENCOUNTER (OUTPATIENT)
Dept: CT IMAGING | Facility: HOSPITAL | Age: 58
Discharge: HOME | End: 2023-12-01
Admitting: STUDENT IN AN ORGANIZED HEALTH CARE EDUCATION/TRAINING PROGRAM
Payer: COMMERCIAL

## 2023-12-01 DIAGNOSIS — Z72.0 TOBACCO USE: ICD-10-CM

## 2023-12-01 PROCEDURE — 71271 CT THORAX LUNG CANCER SCR C-: CPT

## 2023-12-11 ENCOUNTER — OFFICE VISIT (OUTPATIENT)
Dept: FAMILY MEDICINE CLINIC | Facility: CLINIC | Age: 58
End: 2023-12-11
Payer: COMMERCIAL

## 2023-12-11 ENCOUNTER — LAB (OUTPATIENT)
Dept: FAMILY MEDICINE CLINIC | Facility: CLINIC | Age: 58
End: 2023-12-11
Payer: COMMERCIAL

## 2023-12-11 VITALS
WEIGHT: 222.6 LBS | HEART RATE: 95 BPM | SYSTOLIC BLOOD PRESSURE: 148 MMHG | BODY MASS INDEX: 31.87 KG/M2 | RESPIRATION RATE: 18 BRPM | OXYGEN SATURATION: 90 % | HEIGHT: 70 IN | DIASTOLIC BLOOD PRESSURE: 82 MMHG

## 2023-12-11 DIAGNOSIS — R09.81 NASAL CONGESTION: ICD-10-CM

## 2023-12-11 DIAGNOSIS — I10 ESSENTIAL HYPERTENSION: ICD-10-CM

## 2023-12-11 DIAGNOSIS — Z00.00 ANNUAL PHYSICAL EXAM: Primary | ICD-10-CM

## 2023-12-11 DIAGNOSIS — R05.1 ACUTE COUGH: ICD-10-CM

## 2023-12-11 DIAGNOSIS — E78.2 MIXED HYPERLIPIDEMIA: ICD-10-CM

## 2023-12-11 DIAGNOSIS — Z12.5 PROSTATE CANCER SCREENING: ICD-10-CM

## 2023-12-11 DIAGNOSIS — K22.719 BARRETT'S ESOPHAGUS WITH DYSPLASIA: ICD-10-CM

## 2023-12-11 DIAGNOSIS — M1A.0720 CHRONIC GOUT OF LEFT FOOT, UNSPECIFIED CAUSE: ICD-10-CM

## 2023-12-11 DIAGNOSIS — Z87.891 SMOKING HISTORY: ICD-10-CM

## 2023-12-11 LAB
ALBUMIN SERPL-MCNC: 4.6 G/DL (ref 3.5–5.2)
ALBUMIN/GLOB SERPL: 1.5 G/DL
ALP SERPL-CCNC: 90 U/L (ref 39–117)
ALT SERPL W P-5'-P-CCNC: 90 U/L (ref 1–41)
ANION GAP SERPL CALCULATED.3IONS-SCNC: 14.7 MMOL/L (ref 5–15)
AST SERPL-CCNC: 40 U/L (ref 1–40)
BILIRUB SERPL-MCNC: 0.3 MG/DL (ref 0–1.2)
BUN SERPL-MCNC: 15 MG/DL (ref 6–20)
BUN/CREAT SERPL: 16.9 (ref 7–25)
CALCIUM SPEC-SCNC: 9.8 MG/DL (ref 8.6–10.5)
CHLORIDE SERPL-SCNC: 99 MMOL/L (ref 98–107)
CHOLEST SERPL-MCNC: 222 MG/DL (ref 0–200)
CO2 SERPL-SCNC: 28.3 MMOL/L (ref 22–29)
CREAT SERPL-MCNC: 0.89 MG/DL (ref 0.76–1.27)
DEPRECATED RDW RBC AUTO: 41.3 FL (ref 37–54)
EGFRCR SERPLBLD CKD-EPI 2021: 99.3 ML/MIN/1.73
ERYTHROCYTE [DISTWIDTH] IN BLOOD BY AUTOMATED COUNT: 12.5 % (ref 12.3–15.4)
GLOBULIN UR ELPH-MCNC: 3 GM/DL
GLUCOSE SERPL-MCNC: 141 MG/DL (ref 65–99)
HBA1C MFR BLD: 6.2 % (ref 4.8–5.6)
HCT VFR BLD AUTO: 47.7 % (ref 37.5–51)
HDLC SERPL-MCNC: 43 MG/DL (ref 40–60)
HGB BLD-MCNC: 15.3 G/DL (ref 13–17.7)
LDLC SERPL CALC-MCNC: 146 MG/DL (ref 0–100)
LDLC/HDLC SERPL: 3.31 {RATIO}
MCH RBC QN AUTO: 29.5 PG (ref 26.6–33)
MCHC RBC AUTO-ENTMCNC: 32.1 G/DL (ref 31.5–35.7)
MCV RBC AUTO: 92.1 FL (ref 79–97)
PLATELET # BLD AUTO: 309 10*3/MM3 (ref 140–450)
PMV BLD AUTO: 9.6 FL (ref 6–12)
POTASSIUM SERPL-SCNC: 4 MMOL/L (ref 3.5–5.2)
PROT SERPL-MCNC: 7.6 G/DL (ref 6–8.5)
PSA SERPL-MCNC: 1.57 NG/ML (ref 0–4)
RBC # BLD AUTO: 5.18 10*6/MM3 (ref 4.14–5.8)
SODIUM SERPL-SCNC: 142 MMOL/L (ref 136–145)
TRIGL SERPL-MCNC: 184 MG/DL (ref 0–150)
URATE SERPL-MCNC: 9.6 MG/DL (ref 3.4–7)
VLDLC SERPL-MCNC: 33 MG/DL (ref 5–40)
WBC NRBC COR # BLD AUTO: 7.82 10*3/MM3 (ref 3.4–10.8)

## 2023-12-11 PROCEDURE — 84550 ASSAY OF BLOOD/URIC ACID: CPT | Performed by: STUDENT IN AN ORGANIZED HEALTH CARE EDUCATION/TRAINING PROGRAM

## 2023-12-11 PROCEDURE — 83036 HEMOGLOBIN GLYCOSYLATED A1C: CPT | Performed by: STUDENT IN AN ORGANIZED HEALTH CARE EDUCATION/TRAINING PROGRAM

## 2023-12-11 PROCEDURE — 85027 COMPLETE CBC AUTOMATED: CPT | Performed by: STUDENT IN AN ORGANIZED HEALTH CARE EDUCATION/TRAINING PROGRAM

## 2023-12-11 PROCEDURE — 36415 COLL VENOUS BLD VENIPUNCTURE: CPT | Performed by: STUDENT IN AN ORGANIZED HEALTH CARE EDUCATION/TRAINING PROGRAM

## 2023-12-11 PROCEDURE — G0103 PSA SCREENING: HCPCS | Performed by: STUDENT IN AN ORGANIZED HEALTH CARE EDUCATION/TRAINING PROGRAM

## 2023-12-11 PROCEDURE — 80061 LIPID PANEL: CPT | Performed by: STUDENT IN AN ORGANIZED HEALTH CARE EDUCATION/TRAINING PROGRAM

## 2023-12-11 PROCEDURE — 99396 PREV VISIT EST AGE 40-64: CPT | Performed by: STUDENT IN AN ORGANIZED HEALTH CARE EDUCATION/TRAINING PROGRAM

## 2023-12-11 PROCEDURE — 80053 COMPREHEN METABOLIC PANEL: CPT | Performed by: STUDENT IN AN ORGANIZED HEALTH CARE EDUCATION/TRAINING PROGRAM

## 2023-12-11 RX ORDER — AZITHROMYCIN 250 MG/1
TABLET, FILM COATED ORAL
Qty: 6 TABLET | Refills: 0 | Status: SHIPPED | OUTPATIENT
Start: 2023-12-11

## 2023-12-11 RX ORDER — NAPROXEN 500 MG/1
1 TABLET ORAL EVERY 12 HOURS SCHEDULED
COMMUNITY
Start: 2023-11-07 | End: 2023-12-11

## 2023-12-11 RX ORDER — PREDNISONE 20 MG/1
40 TABLET ORAL DAILY
Qty: 10 TABLET | Refills: 0 | Status: SHIPPED | OUTPATIENT
Start: 2023-12-11 | End: 2023-12-16

## 2023-12-11 NOTE — PROGRESS NOTES
"Chief Complaint  Chief Complaint   Patient presents with    Annual Exam    Nasal Congestion    Cough    URI     Subjective        Titus Jarvis is a 58 y.o. male who presents to [facility].    History of Present Illness  Here for annual physical.    Also has URI symptoms.  It started with nasal congestion last week and has now moved into his chest.  He is taking nyquil, dayquil, using cough drops.  No fevers.  No sick contacts that he is aware of.  He has been very fatigued.      Recent low dose CT chest  IMPRESSION:  1. No suspicious pulmonary nodule.   2. Severe hepatic steatosis.  3. Coronary artery calcifications.  4. Elevation of the right diaphragm with associated right middle lobe and right basilar atelectasis.  Recommendation:  Continue annual screening with LDCT    Colonoscopy in 2021 that was reportedly normal, repeat in 7 yrs.  3 yrs f/u for EGD d/t Saeed's esophagus.      Objective   /82   Pulse 95   Resp 18   Ht 177.8 cm (70\")   Wt 101 kg (222 lb 9.6 oz)   SpO2 90%   BMI 31.94 kg/m²     Estimated body mass index is 31.94 kg/m² as calculated from the following:    Height as of this encounter: 177.8 cm (70\").    Weight as of this encounter: 101 kg (222 lb 9.6 oz).     Physical Exam   GEN: In no acute distress, non toxic appearing  HEENT: Pupils equal and reactive to light, sclera clear. Mucous membranes moist. Oropharynx without erythema or exudate. No cervical or submandibular lymphadenopathy.  TM wnl bilaterally.    CV: Regular rate and rhythm, no murmurs, 2+ peripheral pulses, No extremity edema.   RESP: Lungs with end expiratory wheezes in all lung fields.  No crackles.  No IWOB    NEURO: AAO to person, place, and time. CN 2-12 intact grossly.   PSYCH: Affect normal, insight fair      Result Review :              Assessment and Plan     Diagnoses and all orders for this visit:    1. Annual physical exam (Primary)  BP elevated today likely 2/2 " dayquil / nyquil for URI.  Check labs as below.    Encourage regular physical activity and healthy diet.  UTD on colon ca screening, colonoscopy due in 2028.  PSA today for prostate cancer screening.  F/u 1 yr for annual physical.    -     CBC (No Diff)  -     Comprehensive Metabolic Panel  -     Hemoglobin A1c  -     Lipid Panel  -     Uric acid    2. Chronic gout of left foot, unspecified cause  Check uric acid levels, goal < 6 for gout ppx.  If elevated will recommend daily allopurinol.  -     Uric acid    3. Smoking history  Quit smoking in 2022, 86 pack years.    Yearly low dose chest CT for lung ca screening.  Due Dec 2024.      4. Essential hypertension  BP not at goal, <140/90, likely 2/2 dayquil / nyquil.  Continue lisinopril 20 mg and Toprol 100 mg.    5. Mixed hyperlipidemia  Check lipids.      6. Saeed's esophagus with dysplasia  Continue protonix 40 mg daily.    7. Prostate cancer screening  -     PSA Screen    8. Acute cough  Wheezing on exam today.  Treat with prednisone 40 mg daily x 5 days.  If no improvement after 48 hrs start Z gonzales.    -     predniSONE (DELTASONE) 20 MG tablet; Take 2 tablets by mouth Daily for 5 days.  Dispense: 10 tablet; Refill: 0  -     azithromycin (Zithromax Z-Gonzales) 250 MG tablet; Take 2 tablets by mouth on day 1, then 1 tablet daily on days 2-5  Dispense: 6 tablet; Refill: 0    9. Nasal congestion  -     predniSONE (DELTASONE) 20 MG tablet; Take 2 tablets by mouth Daily for 5 days.  Dispense: 10 tablet; Refill: 0  -     azithromycin (Zithromax Z-Gonzales) 250 MG tablet; Take 2 tablets by mouth on day 1, then 1 tablet daily on days 2-5  Dispense: 6 tablet; Refill: 0       Follow Up     Return in about 1 year (around 12/11/2024) for Annual physical.

## 2024-01-31 RX ORDER — LISINOPRIL 20 MG/1
20 TABLET ORAL DAILY
Qty: 90 TABLET | Refills: 1 | Status: SHIPPED | OUTPATIENT
Start: 2024-01-31

## 2024-01-31 RX ORDER — METOPROLOL SUCCINATE 100 MG/1
100 TABLET, EXTENDED RELEASE ORAL DAILY
Qty: 90 TABLET | Refills: 1 | Status: SHIPPED | OUTPATIENT
Start: 2024-01-31

## 2024-01-31 NOTE — TELEPHONE ENCOUNTER
Caller: Titus Jarvis    Relationship: Self    Best call back number: [phone]    Requested Prescriptions:   Requested Prescriptions     Pending Prescriptions Disp Refills    metoprolol succinate XL (TOPROL-XL) 100 MG 24 hr tablet 90 tablet 1     Sig: Take 1 tablet by mouth Daily.    lisinopril (PRINIVIL,ZESTRIL) 20 MG tablet 30 tablet 2     Sig: Take 1 tablet by mouth Daily for 90 days.        Pharmacy where request should be sent: [facility], [address] [phone] [place] [phone]      Last office visit with prescribing clinician: 3/7/2023   Last telemedicine visit with prescribing clinician: Visit date not found   Next office visit with prescribing clinician: 3/12/2024     Additional details provided by patient: PHARMACY NEEDS PRIOR AUTHORIZATION FOR THE YEAR    Does the patient have less than a 3 day supply:  [] Yes  [x] No    Would you like a call back once the refill request has been completed: [] Yes [] No    If the office needs to give you a call back, can they leave a voicemail: [] Yes [] No    Shannon Mckee Rep   01/31/24 08:49 EST

## 2024-03-12 ENCOUNTER — OFFICE VISIT (OUTPATIENT)
Dept: CARDIOLOGY | Facility: CLINIC | Age: 59
End: 2024-03-12
Payer: COMMERCIAL

## 2024-03-12 VITALS
HEIGHT: 70 IN | HEART RATE: 86 BPM | SYSTOLIC BLOOD PRESSURE: 157 MMHG | DIASTOLIC BLOOD PRESSURE: 91 MMHG | RESPIRATION RATE: 18 BRPM | WEIGHT: 227 LBS | BODY MASS INDEX: 32.5 KG/M2

## 2024-03-12 DIAGNOSIS — I42.0 CARDIOMYOPATHY, DILATED: ICD-10-CM

## 2024-03-12 DIAGNOSIS — E78.2 MIXED HYPERLIPIDEMIA: ICD-10-CM

## 2024-03-12 DIAGNOSIS — R94.39 ABNORMAL STRESS TEST: ICD-10-CM

## 2024-03-12 DIAGNOSIS — I10 ESSENTIAL HYPERTENSION: ICD-10-CM

## 2024-03-12 DIAGNOSIS — Z09 FOLLOW-UP EXAM: Primary | ICD-10-CM

## 2024-03-12 DIAGNOSIS — R07.89 CHEST DISCOMFORT: ICD-10-CM

## 2024-03-12 DIAGNOSIS — I25.10 NONOBSTRUCTIVE ATHEROSCLEROSIS OF CORONARY ARTERY: ICD-10-CM

## 2024-03-12 NOTE — PROGRESS NOTES
Cardiology Clinic Note  Alpesh Rock MD, PhD    Subjective:     Encounter Date:03/12/2024      Patient ID: Titus Jarvis is a 58 y.o. male.    Chief Complaint:  Chief Complaint   Patient presents with    Follow-up       HPI:         I the pleasure to see this very pleasant 58-year-old gentleman in f/u. He has a cardiac history of cardiomyopathy historically with a EF of 45% with dilated cardiomyopathy with nonobstructive CAD on heart cath 18 years ago.  Continues to smoke was counseled to quit .  CT scan of the chest reveals emphysema and inflammatory changes with diffuse nodular patterns..  He presented with worsening shortness of breath.  At that time on exam he had wheezing as well as bronchial breath sounds likely concerning for advanced COPD or emphysema.  He has no lower extremity edema no near syncope or palpitations but reported that previously he is short of breath with even minimal activity.  No other palpitations PND orthopnea or other complaints no recent fevers chill sweats nausea vomiting or diarrhea, he denies any chest pain but says he does get some heaviness occasional.  His echo demonstrated EF of 55% with some abnormal septal motion concerning for localized inferoseptal hypokinesis, stress test revealed markedly diminished counts in the inferior and inferoseptal wall concerning for inferoseptal infarct versus attenuation artifact with summed difference score of only 3 but summed stress score of 17.  In the combination of symptoms with abnormal stress test, incomplete visualization of the inferior wall inferoseptal wall with nonobstructive CAD greater than 10 years ago and significant risk factors with smoking hypertension and others as well as symptoms of chest discomfort and shortness of breath patient likely appropriate for invasive ischemic evaluation for definitive evaluation which she is amenable for after informed consent all risks and benefits were discussed.  Ultimately he  underwent left heart catheterization demonstrating left main with no disease, no angiographic disease of the LAD system, no angiographic disease of the circumflex system and 30% mild luminal regularities in the right with widely patent PDA and PLV with normal EF but slightly high LVEDP at 15-18 mmHg with gentle diuresis recommended is also likely treatment for microvascular dysfunction.  We did discuss the emphysematous changes that are seen on CT with evidence of lung damage from smoking, voiced understanding, has been smoking for over 40 years, no new angina, referred to pulmonary for treatment.      Today on follow-up, he has been off cigarettes and smoking for over a year successfully.  No other chest pain unexplained syncope palpitations or other complaints. No CP/soa/remains off tobacco and etoh. No getting much activity and has gained 30lbs since stopping smoking.  Working to increase activity, low cholesterol low-carb diet otherwise no active issues.  Blood pressure elevated today likely secondary to weight gain     Review of systems otherwise negative x14 point review of systems except as mentioned above    Findings  1.  Open aortic pressure of 105/62  2.  Closing pressure was unchanged  3.  Elevated EDP at 18  4.  No transaortic valve or LVOT gradient seen  5.  LV function low normal 50%     Angiography  1 left main large-caliber vessel with no angiographic disease  2.  LAD has 1 diagonal branch which essentially has no angiographic disease throughout the LAD system as well as numerous septal perforators, the LAD does reach the apex  3 the circumflex is nondominant with an obtuse marginal branch has no angiographic disease  4.  RCA is a large caliber dominant vessel with PLV and PDA branches distally.  There is mid 30% lumen regularities with ARTEMIO-3 flow distally and no disease of the PDA L PLV segment significantly with only lumen regularities.     Conclusions and recommendations  1 nonobstructive CAD  most severely 30% the RCA otherwise left side has minimal luminal regularities, low normal EF 50%, elevated EDP concerning for diastolic dysfunction with EDP of 18  2.  Shortness of breath likely explained by heart filling pressures as well as COPD and emphysema changes that are seen on CT.  Patient advised to quit smoking.  Gentle diuresis to decrease filling pressures as well as afterload reduction if indicated.        Vitals reviewed below  Regular rate and rhythm no rubs gallops heave or lift  1 out of 6 stock murmur stable  Radial pulses 2+ equal bilaterally  No clubbing cyanosis or edema  Normal cap refill  Mild expiratory wheezing on exam  Intact grossly  Soft nontender nondistended  Unchanged from prior     Historical data copied forward from previous encounters in EMR including the history, exam, and assessment/plan has been reviewed and is unchanged unless noted otherwise.     Cardiac medicines reviewed with risk, benefits, and necessity of each discussed.     Risk and benefit of cardiac testing reviewed including death heart attack stroke pain bleeding infection need for vascular /cardiovascular surgery were discussed and the patient        Assessment and plan per my encounter  Independently manage medical conditions    Atypical chest pain stable  Shortness of breath dyspnea on exertion: Stable  Abnormal stress test  Abnormal CT with emphysematous changes  High filling pressures with mild diastolic dysfunction  Normal coronaries with nonobstructive CAD most severely 30% RCA  Essential hypertension uncontrolled  History of tobacco abuse     Continue lisinopril 20 daily, metoprolol, add amlodipine 5 daily  Diet exercise, goal for 30 pound weight loss over the next 6 months to year  Twice daily blood pressures to be called clinic     Abnormal CT scan demonstrating emphysema, smoking cessation again recommended at length, made referral to pulmonology, may need inhalers or bronchodilator therapy with or  "without steroid and inhaled fashion  Continue present medicines    Nonobstructive CAD, low-dose statin therapy atorvastatin 10 daily goal LDL less than 70    Continue smoking abstinence     Primary prevention goals  Continue aspirin beta-blocker ACE inhibitor, blood pressures are controlled  Diet and exercise per HI guidelines  Smoking cessation     Refer to primary care  Refill medicines      Back to clinic 1 year    Alpesh Rock MD, PhD         Objective:         /91 (BP Location: Left arm, Patient Position: Sitting)   Pulse 86   Resp 18   Ht 177.8 cm (70\")   Wt 103 kg (227 lb)   BMI 32.57 kg/m²                The pleasure to be involved in this patient's cardiovascular care.  Please call with any questions or concerns  Alpesh Rock MD, PhD    Most recent EKG as reviewed and interpreted by me:  Procedures     Most recent echo as reviewed and interpreted by me:  Results for orders placed during the hospital encounter of 12/30/21    Adult Transthoracic Echo Complete W/ Cont if Necessary Per Protocol    Interpretation Summary  · Left ventricular systolic function is low normal.  · Left ventricular ejection fraction is 55%  · Left ventricular diastolic function was normal.  · Mild hypokinesis of septum is noted      Most recent stress test as reviewed and interpreted by me:  Results for orders placed during the hospital encounter of 12/30/21    Stress Test With Myocardial Perfusion One Day    Interpretation Summary  · Left ventricular ejection fraction is mildly reduced. (Calculated EF = 45%).  · Impressions are consistent with an intermediate risk study.  · Inferior wall has fixed perfusion abnormality with summed rest score of 17 Summed rest score of 14 with no significant reversibility or gavin-infarct ischemia seen EF mildly reduced globally at 45% by gated imaging Summed difference score of 0 Cannot rule out significant diaphragmatic or GI attenuation as responsible for decreased counts in the " inferior wall, overall imaging is improved with stress compared to resting images.  · Findings consistent with an equivocal ECG stress test.    Stress ECG did not reach target heart rate  No changes at submaximal stress  No arrhythmias seen  No chest pain reported  Nuclear imaging with abnormal perfusion in stress in the inferior and apical inferolateral wall which is fixed compared to resting images actually improved with stress overall  Summed stress score 14, summed rest score of 17 with summed difference score of 0  No gavin-infarct ischemia  Cannot rule out inferior infarct versus significant diaphragmatic or GI attenuation, no reversibility seen  Overall EF mildly reduced globally at 45%  Intermediate risk study by criteria      Most recent cardiac catheterization as reviewed interpreted by me:  Results for orders placed during the hospital encounter of 02/17/22    Cardiac Catheterization/Vascular Study    Narrative   Alpesh Rock MD, PhD  [facility] cardiology  Date of service 2-17-22    Procedure  1.  Left heart catheterization with coronary angiography left ventriculography in ARIZMENDI position    Indication  Unexplained dyspnea on exertion, abnormal stress test    After informed consent patient was brought to the Cath Lab sterilely prepped and draped in usual fashion expose the right wrist for right radial access via micropuncture modified center technique with placement of 5/6 slender sheath under ultrasound guidance which was aspirated flushed with heparinized saline.  Standard cocktail of heparin nitroglycerin and Cardene was administered via the sheath followed by a 3 5 guidewire which of the aortic valve with diagnostic 5 Turkmen JL 3 and JR4 catheters for selective left and right coronary angiography.  Angiography was performed with no obstructive CAD in the left system with essentially smooth contour of the coronary system.  Right had nonobstructive CAD most severely 30% in the  midportion but widely patent vessels with ARTEMIO-3 flow throughout.  LV gram demonstrated normal EF however waveforms in the LV demonstrated elevated filling pressures EDP of 18 likely some degree of diastolic dysfunction.  All catheters equipment removed the sheath flushed with heparinized saline which was ultimately removed for placement of a TR band with immediate hemostasis.  There were no complications patient left the Cath Lab chest pain-free hemodynamically electrically stable alert talking to staff neurologically grossly intact bilaterally    Complications none  Blood loss less than 5 cc  Contrast usage 85 cc for the totality of the procedure  Moderate conscious sedation time of 30 minutes with IV Versed and fentanyl administered by registered nurse with complete ECG pulse oximetry and hemodynamic monitoring throughout the entirety the case observed by me    Findings  1.  Open aortic pressure of 105/62  2.  Closing pressure was unchanged  3.  Elevated EDP at 18  4.  No transaortic valve or LVOT gradient seen  5.  LV function low normal 50%    Angiography  1 left main large-caliber vessel with no angiographic disease  2.  LAD has 1 diagonal branch which essentially has no angiographic disease throughout the LAD system as well as numerous septal perforators, the LAD does reach the apex  3 the circumflex is nondominant with an obtuse marginal branch has no angiographic disease  4.  RCA is a large caliber dominant vessel with PLV and PDA branches distally.  There is mid 30% lumen regularities with ARTEMIO-3 flow distally and no disease of the PDA L PLV segment significantly with only lumen regularities.    Conclusions and recommendations  1 nonobstructive CAD most severely 30% the RCA otherwise left side has minimal luminal regularities, low normal EF 50%, elevated EDP concerning for diastolic dysfunction with EDP of 18  2.  Shortness of breath likely explained by heart filling pressures as well as COPD and emphysema  changes that are seen on CT.  Patient advised to quit smoking.  Gentle diuresis to decrease filling pressures as well as afterload reduction if indicated.    Patient be discharged and followed up in cardiology clinic for further recommendations titration of medical therapy    Patient be discharged safely today Home    Alpesh Rock MD, PhD    The following portions of the patient's history were reviewed and updated as appropriate: allergies, current medications, past family history, past medical history, past social history, past surgical history, and problem list.      ROS:  14 point review of systems negative except as mentioned above    Current Outpatient Medications:     allopurinol (Zyloprim) 100 MG tablet, Take 1 tablet by mouth Daily. (Patient taking differently: Take 1 tablet by mouth As Needed.), Disp: 90 tablet, Rfl: 1    colchicine 0.6 MG tablet, Take 2 on day 1 of flare, then 1 a day until symptoms improve (Patient taking differently: As Needed. Take 2 on day 1 of flare, then 1 a day until symptoms improve), Disp: 20 tablet, Rfl: 0    lisinopril (PRINIVIL,ZESTRIL) 20 MG tablet, Take 1 tablet by mouth Daily., Disp: 90 tablet, Rfl: 1    metoprolol succinate XL (TOPROL-XL) 100 MG 24 hr tablet, Take 1 tablet by mouth Daily., Disp: 90 tablet, Rfl: 1    pantoprazole (PROTONIX) 40 MG EC tablet, Take 1 tablet by mouth Daily., Disp: , Rfl:     Problem List:  Patient Active Problem List   Diagnosis    Saeed's esophagus    Cardiomyopathy, dilated    Personal history of tobacco use, presenting hazards to health    Essential hypertension    Nonobstructive atherosclerosis of coronary artery    Abnormal stress test    Pulmonary emphysema    Mixed hyperlipidemia    Class 1 obesity due to excess calories with serious comorbidity and body mass index (BMI) of 30.0 to 30.9 in adult     Past Medical History:  Past Medical History:   Diagnosis Date    Cardiomyopathy, dilated 09/18/2019    Essential hypertension 09/18/2019     Mixed hyperlipidemia 2022    Nonobstructive atherosclerosis of coronary artery 2022    Pulmonary emphysema 2022     Past Surgical History:  Past Surgical History:   Procedure Laterality Date    CARDIAC CATHETERIZATION      CARDIAC CATHETERIZATION N/A 2022    Procedure: Left Heart Cath; Right Radial;  Surgeon: Alpesh Rock MD;  Location: [facility] CATH INVASIVE LOCATION;  Service: Cardiology;  Laterality: N/A;    COLONOSCOPY      KNEE MENISCAL REPAIR Left      Social History:  Social History     Socioeconomic History    Marital status: Single   Tobacco Use    Smoking status: Former     Current packs/day: 0.00     Average packs/day: 2.0 packs/day for 46.1 years (92.3 ttl pk-yrs)     Types: Cigarettes     Start date: 1976     Quit date: 2022     Years since quittin.0     Passive exposure: Past    Smokeless tobacco: Never    Tobacco comments:     none since 2022   Vaping Use    Vaping status: Some Days    Substances: Nicotine   Substance and Sexual Activity    Alcohol use: Yes     Alcohol/week: 5.0 standard drinks of alcohol     Types: 5 Cans of beer per week    Drug use: Never    Sexual activity: Yes     Partners: Female     Birth control/protection: Condom     Allergies:  No Known Allergies  Immunizations:  Immunization History   Administered Date(s) Administered    Flu Vaccine Intradermal Quad 18-64YR 10/05/2017, 2020    Fluzone (or Fluarix & Flulaval for VFC) >6mos 2020            In-Office Procedure(s):  No orders to display        ASCVD RIsk Score::  The ASCVD Risk score ([place] DK, et al., 2019) failed to calculate for the following reasons:    The patient has a prior MI or stroke diagnosis    Imaging:    Results for orders placed in visit on 02/15/21    XR Chest 2 View    Narrative  Examination: XR CHEST 2 VW-    Date of Exam: 2/15/2021 8:43 AM    Indication: PRE OPS, JOINT PAIN; Z01.818-Encounter for other  preprocedural examination; M25.50-Pain  in unspecified joint.  Preop left  knee arthroplasty. Previous smoking history.    Comparison: Radiograph 03/25/2015    Technique: 2 radiographic views of the chest were obtained.    Findings:  There are no airspace consolidations. No pleural effusions. No  pneumothorax. The heart size is normal. The pulmonary vasculature  appears within normal limits. No acute osseous abnormality identified.    Impression  No acute cardiopulmonary process.    Electronically Signed By-Carmelina Riojas MD On:2/15/2021 9:08 AM  This report was finalized on [id number] by  Carmelina Riojas MD.       Results for orders placed during the hospital encounter of 12/01/23    CT Chest Low Dose Cancer Screening WO    Narrative  CT CHEST LOW DOSE CANCER SCREENING WO    Date of Exam: 12/1/2023 9:11 AM EST    Indication: Lung cancer screening, >= 20 pk-yr smoking history (Age >= 50y).    Comparison: CT abdomen and pelvis with contrast 6/25/2021    Technique: Low dose CT imaging of the chest was performed without intravenous contrast enhancement.  Automated exposure control and iterative reconstruction methods were used.    Findings:  Soft tissue of the lower neck are without acute abnormality. Heart size normal. Mild coronary artery calcification. No pericardial or pleural effusion. Scattered mediastinal lymph nodes below size criteria. No axillary adenopathy. Mild eventration of the  right hemidiaphragm with adjacent atelectasis of the right lower lobe and right middle lobe.    Trachea and mainstem bronchi are patent. No pneumothorax. No suspicious pulmonary nodule or mass. Benign pleural-based 4 mm nodule in the posterior right upper lobe (5/44). No findings of pneumonia.    Upper abdomen demonstrates severe hepatic steatosis. Normal left adrenal gland and spleen. There is a right adrenal gland nodule consistent with suspected myelolipoma measuring 2 cm. No aggressive osseous lesion or fracture.    Impression  Impression:  1. No suspicious pulmonary  nodule.  2. Severe hepatic steatosis.  3. Coronary artery calcifications.  4. Elevation of the right diaphragm with associated right middle lobe and right basilar atelectasis.    Recommendation:  Continue annual screening with LDCT    Lung Rads Assessment:  Lung-RADS L1 - Negative, <1% chance of malignancy.        Electronically Signed: Skyler Mccray MD  12/1/2023 2:16 PM EST  Workstation ID: [id number]      Results for orders placed during the hospital encounter of 12/01/23    CT Chest Low Dose Cancer Screening WO    Narrative  CT CHEST LOW DOSE CANCER SCREENING WO    Date of Exam: 12/1/2023 9:11 AM EST    Indication: Lung cancer screening, >= 20 pk-yr smoking history (Age >= 50y).    Comparison: CT abdomen and pelvis with contrast 6/25/2021    Technique: Low dose CT imaging of the chest was performed without intravenous contrast enhancement.  Automated exposure control and iterative reconstruction methods were used.    Findings:  Soft tissue of the lower neck are without acute abnormality. Heart size normal. Mild coronary artery calcification. No pericardial or pleural effusion. Scattered mediastinal lymph nodes below size criteria. No axillary adenopathy. Mild eventration of the  right hemidiaphragm with adjacent atelectasis of the right lower lobe and right middle lobe.    Trachea and mainstem bronchi are patent. No pneumothorax. No suspicious pulmonary nodule or mass. Benign pleural-based 4 mm nodule in the posterior right upper lobe (5/44). No findings of pneumonia.    Upper abdomen demonstrates severe hepatic steatosis. Normal left adrenal gland and spleen. There is a right adrenal gland nodule consistent with suspected myelolipoma measuring 2 cm. No aggressive osseous lesion or fracture.    Impression  Impression:  1. No suspicious pulmonary nodule.  2. Severe hepatic steatosis.  3. Coronary artery calcifications.  4. Elevation of the right diaphragm with associated right middle lobe and right basilar  atelectasis.    Recommendation:  Continue annual screening with LDCT    Lung Rads Assessment:  Lung-RADS L1 - Negative, <1% chance of malignancy.        Electronically Signed: Skyler Mccray MD  12/1/2023 2:16 PM EST  Workstation ID: [id number]      Lab Review:   Office Visit on 12/11/2023   Component Date Value    WBC 12/11/2023 7.82     RBC 12/11/2023 5.18     Hemoglobin 12/11/2023 15.3     Hematocrit 12/11/2023 47.7     MCV 12/11/2023 92.1     MCH 12/11/2023 29.5     MCHC 12/11/2023 32.1     RDW 12/11/2023 12.5     RDW-SD 12/11/2023 41.3     MPV 12/11/2023 9.6     Platelets 12/11/2023 309     Glucose 12/11/2023 141 (H)     BUN 12/11/2023 15     Creatinine 12/11/2023 0.89     Sodium 12/11/2023 142     Potassium 12/11/2023 4.0     Chloride 12/11/2023 99     CO2 12/11/2023 28.3     Calcium 12/11/2023 9.8     Total Protein 12/11/2023 7.6     Albumin 12/11/2023 4.6     ALT (SGPT) 12/11/2023 90 (H)     AST (SGOT) 12/11/2023 40     Alkaline Phosphatase 12/11/2023 90     Total Bilirubin 12/11/2023 0.3     Globulin 12/11/2023 3.0     A/G Ratio 12/11/2023 1.5     BUN/Creatinine Ratio 12/11/2023 16.9     Anion Gap 12/11/2023 14.7     eGFR 12/11/2023 99.3     Hemoglobin A1C 12/11/2023 6.20 (H)     Total Cholesterol 12/11/2023 222 (H)     Triglycerides 12/11/2023 184 (H)     HDL Cholesterol 12/11/2023 43     LDL Cholesterol  12/11/2023 146 (H)     VLDL Cholesterol 12/11/2023 33     LDL/HDL Ratio 12/11/2023 3.31     PSA 12/11/2023 1.570     Uric Acid 12/11/2023 9.6 (H)      Recent labs reviewed and interpreted for clinical significance and application            Level of Care:           Alpesh Rock MD  03/12/24  .

## 2024-04-01 ENCOUNTER — TELEPHONE (OUTPATIENT)
Dept: CARDIOLOGY | Facility: CLINIC | Age: 59
End: 2024-04-01

## 2024-04-01 NOTE — TELEPHONE ENCOUNTER
Caller: Titus Jarvis    Relationship to patient: Self    Best call back number:  [phone]    Patient is needing: PATIENT STATED PER LAST VISIT THAT DR BURRELL WAS PUTTING HIM ON A NEW CHOLESTEROL MEDICATION AND AMLODPINE. PATIENT DID NOT RECEIVE MEDICATIONS. DO NOT SEE IN MEDICATION LIST

## 2024-10-03 RX ORDER — LISINOPRIL 20 MG/1
20 TABLET ORAL DAILY
Qty: 90 TABLET | Refills: 1 | Status: SHIPPED | OUTPATIENT
Start: 2024-10-03

## 2024-10-03 RX ORDER — METOPROLOL SUCCINATE 100 MG/1
100 TABLET, EXTENDED RELEASE ORAL DAILY
Qty: 90 TABLET | Refills: 1 | Status: SHIPPED | OUTPATIENT
Start: 2024-10-03

## 2025-02-10 RX ORDER — METOPROLOL SUCCINATE 100 MG/1
100 TABLET, EXTENDED RELEASE ORAL DAILY
Qty: 90 TABLET | Refills: 1 | Status: SHIPPED | OUTPATIENT
Start: 2025-02-10

## 2025-02-10 RX ORDER — ATORVASTATIN CALCIUM 10 MG/1
10 TABLET, FILM COATED ORAL DAILY
Qty: 90 TABLET | Refills: 3 | Status: SHIPPED | OUTPATIENT
Start: 2025-02-10

## 2025-02-10 RX ORDER — AMLODIPINE BESYLATE 5 MG/1
5 TABLET ORAL DAILY
Qty: 90 TABLET | Refills: 3 | Status: SHIPPED | OUTPATIENT
Start: 2025-02-10

## 2025-02-10 RX ORDER — LISINOPRIL 20 MG/1
20 TABLET ORAL DAILY
Qty: 90 TABLET | Refills: 1 | Status: SHIPPED | OUTPATIENT
Start: 2025-02-10

## 2025-02-20 ENCOUNTER — TELEPHONE (OUTPATIENT)
Dept: FAMILY MEDICINE CLINIC | Facility: CLINIC | Age: 60
End: 2025-02-20

## 2025-02-20 NOTE — TELEPHONE ENCOUNTER
Caller: Titus Jarvis    Relationship: Self    Best call back number: [phone]    What medication are you requesting: WEIGHT LOSS MEDICATION       If a prescription is needed, what is your preferred pharmacy and phone number:        [facility] [place] #[id number] - [facility] [address] AT [facility] - [phone] [place] [phone]  [phone]     Additional notes:    PATIENT WANTING TO KNOW WHAT DR CLEMENTS WOULD RECOMMEND     PLEASE ADVISE

## 2025-02-26 NOTE — TELEPHONE ENCOUNTER
Advised pt of provider's recommendation. Pt stated he has high-deductible plan, likely will pay quite a bit before anything is covered. Scheduled 2/4

## 2025-03-04 ENCOUNTER — OFFICE VISIT (OUTPATIENT)
Dept: FAMILY MEDICINE CLINIC | Facility: CLINIC | Age: 60
End: 2025-03-04
Payer: COMMERCIAL

## 2025-03-04 VITALS
SYSTOLIC BLOOD PRESSURE: 139 MMHG | DIASTOLIC BLOOD PRESSURE: 88 MMHG | OXYGEN SATURATION: 94 % | WEIGHT: 221.8 LBS | HEIGHT: 69 IN | BODY MASS INDEX: 32.85 KG/M2 | HEART RATE: 72 BPM | RESPIRATION RATE: 18 BRPM

## 2025-03-04 DIAGNOSIS — Z87.891 SMOKING HISTORY: ICD-10-CM

## 2025-03-04 DIAGNOSIS — I25.10 NONOBSTRUCTIVE ATHEROSCLEROSIS OF CORONARY ARTERY: ICD-10-CM

## 2025-03-04 DIAGNOSIS — E66.9 OBESITY (BMI 30-39.9): Primary | ICD-10-CM

## 2025-03-04 DIAGNOSIS — I10 ESSENTIAL HYPERTENSION: ICD-10-CM

## 2025-03-04 PROCEDURE — 99214 OFFICE O/P EST MOD 30 MIN: CPT | Performed by: STUDENT IN AN ORGANIZED HEALTH CARE EDUCATION/TRAINING PROGRAM

## 2025-03-04 NOTE — PROGRESS NOTES
"Chief Complaint  Chief Complaint   Patient presents with    Weight Loss     Discuss weight loss         Subjective        Titus Jarvis is a 59 y.o. male who presents to [facility].  History of Present Illness    History of Present Illness  The patient is a 59-year-old male presenting to discuss weight loss medication options.    He is interested in weight loss medications.  His weight goal is 175-185 pounds. He attributes weight gain to smoking cessation in 2022. He attempted dietary changes, including a cabbage soup diet, but found it unsustainable. His diet is high in carbohydrates, low in protein, with frequent junk food due to extensive travel. He does not drink soda but occasionally consumes beer.    He has a history of meniscus tear in his knee, treated with an injection a month ago. He reports satisfactory progress but experiences right hip discomfort during prolonged walking. He engages in other forms of exercise.    He is due for lung cancer screening, having quit smoking in 2022.     Objective   /88   Pulse 72   Resp 18   Ht 175.3 cm (69\")   Wt 101 kg (221 lb 12.8 oz)   SpO2 94%   BMI 32.75 kg/m²     Estimated body mass index is 32.75 kg/m² as calculated from the following:    Height as of this encounter: 175.3 cm (69\").    Weight as of this encounter: 101 kg (221 lb 12.8 oz).     Physical Exam   GEN: In no acute distress, non toxic appearing  HEENT: Pupils equal and reactive to light, sclera clear. Mucous membranes moist.   NEURO: AAO to person, place, and time. CN 2-12 intact grossly.   PSYCH: Affect normal, insight fair     PHQ-2 Depression Screening  Little interest or pleasure in doing things? Not at all   Feeling down, depressed, or hopeless? Not at all   PHQ-2 Total Score 0      Result Review :              Assessment and Plan     Diagnoses and all orders for this visit:    1. Obesity (BMI 30-39.9) (Primary)  -     Semaglutide-Weight Management " 0.25 MG/0.5ML solution auto-injector; Inject 0.5 mL under the skin into the appropriate area as directed Every 7 (Seven) Days. After 4 wks increase to 0.5 mg weekly  Dispense: 2 mL; Refill: 0    2. Nonobstructive atherosclerosis of coronary artery  -     Semaglutide-Weight Management 0.25 MG/0.5ML solution auto-injector; Inject 0.5 mL under the skin into the appropriate area as directed Every 7 (Seven) Days. After 4 wks increase to 0.5 mg weekly  Dispense: 2 mL; Refill: 0    3. Essential hypertension  Orders:  -     Semaglutide-Weight Management 0.25 MG/0.5ML solution auto-injector; Inject 0.5 mL under the skin into the appropriate area as directed Every 7 (Seven) Days. After 4 wks increase to 0.5 mg weekly  Dispense: 2 mL; Refill: 0    4. Smoking history  -      CT Chest Low Dose Cancer Screening WO; Future          Assessment & Plan  Obesity / CAD  - Initiate semaglutide 0.25 mg weekly for 4 weeks, then increase to 0.5 mg weekly until weight stabilization  - Target weight: 175-185 pounds  - Discussed potential side effects: nausea, upset stomach, constipation  - Advised injection into belly or thigh, using alcohol wipes  - If weight loss plateaus, consider increasing dose to 1 mg    Hypertension  - Continue current regimen: amlodipine 5 mg daily, lisinopril 20 mg daily, metoprolol succinate 100 mg daily  - May be able to decrease if weight loss is successful     Smoking history  - Schedule lung cancer screening      Follow Up     Return in about 4 months (around 7/4/2025) for Annual physical and weight check .    Patient or patient representative verbalized consent for the use of Ambient Listening during the visit with  Lev Thompson DO for chart documentation. 3/4/2025  09:15 EST

## 2025-03-12 ENCOUNTER — OFFICE VISIT (OUTPATIENT)
Dept: CARDIOLOGY | Facility: CLINIC | Age: 60
End: 2025-03-12
Payer: COMMERCIAL

## 2025-03-12 VITALS
DIASTOLIC BLOOD PRESSURE: 74 MMHG | HEART RATE: 89 BPM | RESPIRATION RATE: 18 BRPM | WEIGHT: 211 LBS | OXYGEN SATURATION: 91 % | SYSTOLIC BLOOD PRESSURE: 111 MMHG | BODY MASS INDEX: 31.25 KG/M2 | HEIGHT: 69 IN

## 2025-03-12 DIAGNOSIS — I25.10 CORONARY ARTERY DISEASE INVOLVING NATIVE CORONARY ARTERY OF NATIVE HEART WITHOUT ANGINA PECTORIS: Primary | ICD-10-CM

## 2025-03-12 PROCEDURE — 99214 OFFICE O/P EST MOD 30 MIN: CPT | Performed by: INTERNAL MEDICINE

## 2025-03-12 PROCEDURE — 93000 ELECTROCARDIOGRAM COMPLETE: CPT | Performed by: INTERNAL MEDICINE

## 2025-03-12 NOTE — PROGRESS NOTES
Cardiology Clinic Note  Alpesh Rock MD, PhD    Subjective:     Encounter Date:03/12/2025      Patient ID: Titus Jarvis is a 59 y.o. male.    Chief Complaint:  Chief Complaint   Patient presents with    Follow-up       HPI:    I the pleasure to see this very pleasant 59-year-old gentleman in f/u. He has a cardiac history  EF of 45% with dilated cardiomyopathy with nonobstructive CAD on heart cath 2022.   Prior CT scan of the chest reveals emphysema and inflammatory changes with diffuse nodular patterns..  More recently with medical therapy, his echo 2022 demonstrated EF of 55% with some abnormal septal motion concerning for localized inferoseptal hypokinesis, stress test revealed markedly diminished counts in the inferior and inferoseptal wall concerning for inferoseptal infarct versus attenuation artifact, heart cath mentioned above demonstrated nonobstructive disease 30% luminal regularities diffusely..  He had normal EF 55%, slightly high LVEDP at 15-18 mmHg with gentle diuresis recommended is also likely treatment for microvascular dysfunction at the time of.       Today on follow-up, no new symptoms.  He has been off cigarettes and smoking for over a year successfully he is using a vape however.  No other chest pain unexplained syncope palpitations or other complaints. No CP/soa/remains off tobacco and etoh. No getting much activity and has gained 30lbs since stopping smoking.  Working to increase activity, low cholesterol low-carb diet otherwise no active issues.  His weight is down 15 pounds year-over-year.     Review of systems otherwise negative x14 point review of systems except as mentioned above     Findings  1.  Open aortic pressure of 105/62  2.  Closing pressure was unchanged  3.  Elevated EDP at 18  4.  No transaortic valve or LVOT gradient seen  5.  LV function low normal 50%     Angiography  1 left main large-caliber vessel with no angiographic disease  2.  LAD has 1 diagonal branch  which essentially has no angiographic disease throughout the LAD system as well as numerous septal perforators, the LAD does reach the apex  3 the circumflex is nondominant with an obtuse marginal branch has no angiographic disease  4.  RCA is a large caliber dominant vessel with PLV and PDA branches distally.  There is mid 30% lumen regularities with ARTEMIO-3 flow distally and no disease of the PDA L PLV segment significantly with only lumen regularities.     Conclusions and recommendations  1 nonobstructive CAD most severely 30% the RCA otherwise left side has minimal luminal regularities, low normal EF 50%, elevated EDP concerning for diastolic dysfunction with EDP of 18  2.  Shortness of breath likely explained by heart filling pressures as well as COPD and emphysema changes that are seen on CT.  Patient advised to quit smoking.  Gentle diuresis to decrease filling pressures as well as afterload reduction if indicated.        Vitals reviewed below  Regular rate and rhythm no rubs gallops heave or lift  1 out of 6 stock murmur stable  Radial pulses 2+ equal bilaterally  No clubbing cyanosis or edema  Normal cap refill  Mild expiratory wheezing on exam  Intact grossly  Soft nontender nondistended  Unchanged from prior     Historical data copied forward from previous encounters in EMR including the history, exam, and assessment/plan has been reviewed and is unchanged unless noted otherwise.     Cardiac medicines reviewed with risk, benefits, and necessity of each discussed.            Assessment and plan per my encounter  Independently manage medical conditions     Atypical chest pain stable  Shortness of breath dyspnea on exertion: Stable  Abnormal stress test  Abnormal CT with emphysematous changes  High filling pressures with mild diastolic dysfunction  Normal coronaries with nonobstructive CAD most severely 30% RCA  Essential hypertension uncontrolled  History of tobacco abuse     Continue lisinopril 20 daily,  "metoprolol, add amlodipine 5 daily  Diet exercise, goal for 30 pound weight loss over the next 6 months to year  Twice daily blood pressures to be called clinic     Abnormal CT scan demonstrating emphysema, smoking cessation and abstinence discussed, he is on a vape.  Prior referral to pulmonology, may need inhalers or bronchodilator therapy with or without steroid and inhaled fashion  Continue present medicines     Nonobstructive CAD, low-dose statin therapy atorvastatin 10 daily goal LDL less than 70     Primary prevention goals  Continue aspirin beta-blocker ACE inhibitor, blood pressures are controlled  Diet and exercise per HI guidelines  Smoking cessation    Successful weight loss continue diet exercise     Refer to primary care  Refill medicines        Back to clinic 1 year    Objective:         /74 (BP Location: Right arm, Patient Position: Sitting)   Pulse 89   Resp 18   Ht 175.3 cm (69\")   Wt 95.7 kg (211 lb)   SpO2 91%   BMI 31.16 kg/m²     Physical Exam    Assessment:         There are no diagnoses linked to this encounter.       Plan:              The pleasure to be involved in this patient's cardiovascular care.  Please call with any questions or concerns  Alpesh Rock MD, PhD    Most recent EKG as reviewed and interpreted by me:    ECG 12 Lead    Date/Time: 3/12/2025 3:43 PM  Performed by: Alpesh Rock MD    Authorized by: Alpesh Rock MD  Comparison: not compared with previous ECG   Previous ECG: no previous ECG available  Rhythm: sinus rhythm  Rate: normal  Conduction: conduction normal  QRS axis: normal  Other findings: non-specific ST-T wave changes         Most recent echo as reviewed and interpreted by me:  Results for orders placed during the hospital encounter of 12/30/21    Adult Transthoracic Echo Complete W/ Cont if Necessary Per Protocol    Interpretation Summary  · Left ventricular systolic function is low normal.  · Left ventricular ejection fraction " is 55%  · Left ventricular diastolic function was normal.  · Mild hypokinesis of septum is noted      Most recent stress test as reviewed and interpreted by me:  Results for orders placed during the hospital encounter of 12/30/21    Stress Test With Myocardial Perfusion One Day    Interpretation Summary  · Left ventricular ejection fraction is mildly reduced. (Calculated EF = 45%).  · Impressions are consistent with an intermediate risk study.  · Inferior wall has fixed perfusion abnormality with summed rest score of 17 Summed rest score of 14 with no significant reversibility or gavin-infarct ischemia seen EF mildly reduced globally at 45% by gated imaging Summed difference score of 0 Cannot rule out significant diaphragmatic or GI attenuation as responsible for decreased counts in the inferior wall, overall imaging is improved with stress compared to resting images.  · Findings consistent with an equivocal ECG stress test.    Stress ECG did not reach target heart rate  No changes at submaximal stress  No arrhythmias seen  No chest pain reported  Nuclear imaging with abnormal perfusion in stress in the inferior and apical inferolateral wall which is fixed compared to resting images actually improved with stress overall  Summed stress score 14, summed rest score of 17 with summed difference score of 0  No gavin-infarct ischemia  Cannot rule out inferior infarct versus significant diaphragmatic or GI attenuation, no reversibility seen  Overall EF mildly reduced globally at 45%  Intermediate risk study by criteria      Most recent cardiac catheterization as reviewed interpreted by me:  Results for orders placed during the hospital encounter of 02/17/22    Cardiac Catheterization/Vascular Study    Narrative   Alpesh Rock MD, PhD  [facility] cardiology  Date of service 2-17-22    Procedure  1.  Left heart catheterization with coronary angiography left ventriculography in ARIZMENDI  position    Indication  Unexplained dyspnea on exertion, abnormal stress test    After informed consent patient was brought to the Cath Lab sterilely prepped and draped in usual fashion expose the right wrist for right radial access via micropuncture modified center technique with placement of 5/6 slender sheath under ultrasound guidance which was aspirated flushed with heparinized saline.  Standard cocktail of heparin nitroglycerin and Cardene was administered via the sheath followed by a 3 5 guidewire which of the aortic valve with diagnostic 5 Citizen of [place] JL 3 and JR4 catheters for selective left and right coronary angiography.  Angiography was performed with no obstructive CAD in the left system with essentially smooth contour of the coronary system.  Right had nonobstructive CAD most severely 30% in the midportion but widely patent vessels with ARTEMIO-3 flow throughout.  LV gram demonstrated normal EF however waveforms in the LV demonstrated elevated filling pressures EDP of 18 likely some degree of diastolic dysfunction.  All catheters equipment removed the sheath flushed with heparinized saline which was ultimately removed for placement of a TR band with immediate hemostasis.  There were no complications patient left the Cath Lab chest pain-free hemodynamically electrically stable alert talking to staff neurologically grossly intact bilaterally    Complications none  Blood loss less than 5 cc  Contrast usage 85 cc for the totality of the procedure  Moderate conscious sedation time of 30 minutes with IV Versed and fentanyl administered by registered nurse with complete ECG pulse oximetry and hemodynamic monitoring throughout the entirety the case observed by me    Findings  1.  Open aortic pressure of 105/62  2.  Closing pressure was unchanged  3.  Elevated EDP at 18  4.  No transaortic valve or LVOT gradient seen  5.  LV function low normal 50%    Angiography  1 left main large-caliber vessel with no angiographic  disease  2.  LAD has 1 diagonal branch which essentially has no angiographic disease throughout the LAD system as well as numerous septal perforators, the LAD does reach the apex  3 the circumflex is nondominant with an obtuse marginal branch has no angiographic disease  4.  RCA is a large caliber dominant vessel with PLV and PDA branches distally.  There is mid 30% lumen regularities with ARTEMIO-3 flow distally and no disease of the PDA L PLV segment significantly with only lumen regularities.    Conclusions and recommendations  1 nonobstructive CAD most severely 30% the RCA otherwise left side has minimal luminal regularities, low normal EF 50%, elevated EDP concerning for diastolic dysfunction with EDP of 18  2.  Shortness of breath likely explained by heart filling pressures as well as COPD and emphysema changes that are seen on CT.  Patient advised to quit smoking.  Gentle diuresis to decrease filling pressures as well as afterload reduction if indicated.    Patient be discharged and followed up in cardiology clinic for further recommendations titration of medical therapy    Patient be discharged safely today Home    Alpesh Rock MD, PhD    The following portions of the patient's history were reviewed and updated as appropriate: allergies, current medications, past family history, past medical history, past social history, past surgical history, and problem list.      ROS:  14 point review of systems negative except as mentioned above    Current Outpatient Medications:     allopurinol (Zyloprim) 100 MG tablet, Take 1 tablet by mouth Daily. (Patient taking differently: Take 1 tablet by mouth As Needed.), Disp: 90 tablet, Rfl: 1    amLODIPine (NORVASC) 5 MG tablet, TAKE ONE TABLET BY MOUTH EVERY DAY, Disp: 90 tablet, Rfl: 3    atorvastatin (LIPITOR) 10 MG tablet, TAKE ONE TABLET BY MOUTH EVERY DAY, Disp: 90 tablet, Rfl: 3    colchicine 0.6 MG tablet, Take 2 on day 1 of flare, then 1 a day until symptoms improve  (Patient taking differently: As Needed. Take 2 on day 1 of flare, then 1 a day until symptoms improve), Disp: 20 tablet, Rfl: 0    lisinopril (PRINIVIL,ZESTRIL) 20 MG tablet, TAKE ONE TABLET BY MOUTH EVERY DAY, Disp: 90 tablet, Rfl: 1    metoprolol succinate XL (TOPROL-XL) 100 MG 24 hr tablet, TAKE ONE TABLET BY MOUTH EVERY DAY, Disp: 90 tablet, Rfl: 1    pantoprazole (PROTONIX) 40 MG EC tablet, Take 1 tablet by mouth Daily., Disp: , Rfl:     Semaglutide-Weight Management 0.25 MG/0.5ML solution auto-injector, Inject 0.5 mL under the skin into the appropriate area as directed Every 7 (Seven) Days. After 4 wks increase to 0.5 mg weekly, Disp: 2 mL, Rfl: 0    Problem List:  Patient Active Problem List   Diagnosis    Saeed's esophagus    Cardiomyopathy, dilated    Personal history of tobacco use, presenting hazards to health    Essential hypertension    Nonobstructive atherosclerosis of coronary artery    Abnormal stress test    Pulmonary emphysema    Mixed hyperlipidemia    Class 1 obesity due to excess calories with serious comorbidity and body mass index (BMI) of 30.0 to 30.9 in adult     Past Medical History:  Past Medical History:   Diagnosis Date    Cardiomyopathy, dilated 09/18/2019    Essential hypertension 09/18/2019    Mixed hyperlipidemia 05/16/2022    Nonobstructive atherosclerosis of coronary artery 02/08/2022    Pulmonary emphysema 05/16/2022     Past Surgical History:  Past Surgical History:   Procedure Laterality Date    CARDIAC CATHETERIZATION      CARDIAC CATHETERIZATION N/A 02/17/2022    Procedure: Left Heart Cath; Right Radial;  Surgeon: Alpesh Rock MD;  Location: [facility] CATH INVASIVE LOCATION;  Service: Cardiology;  Laterality: N/A;    COLONOSCOPY      KNEE MENISCAL REPAIR Left      Social History:  Social History     Socioeconomic History    Marital status: Single   Tobacco Use    Smoking status: Former     Current packs/day: 0.00     Average packs/day: 2.0 packs/day for 46.1 years  (92.3 ttl pk-yrs)     Types: Cigarettes     Start date: 1/1/1976     Quit date: 2/21/2022     Years since quitting: 3.0     Passive exposure: Past    Smokeless tobacco: Never    Tobacco comments:     none since 2-   Vaping Use    Vaping status: Every Day    Substances: Nicotine   Substance and Sexual Activity    Alcohol use: Yes     Alcohol/week: 5.0 standard drinks of alcohol     Types: 5 Cans of beer per week    Drug use: Never    Sexual activity: Yes     Partners: Female     Birth control/protection: Condom     Allergies:  No Known Allergies  Immunizations:  Immunization History   Administered Date(s) Administered    Flu Vaccine Intradermal Quad 18-64YR 10/05/2017, 09/01/2020    Fluzone (or Fluarix & Flulaval for VFC) >6mos 09/01/2020            In-Office Procedure(s):  No orders to display        ASCVD RIsk Score::  The 10-year ASCVD risk score (Олег DK, et al., 2019) is: 8.9%    Values used to calculate the score:      Age: 59 years      Sex: Male      Is Non- : No      Diabetic: No      Tobacco smoker: No      Systolic Blood Pressure: 111 mmHg      Is BP treated: Yes      HDL Cholesterol: 43 mg/dL      Total Cholesterol: 222 mg/dL    Imaging:    Results for orders placed in visit on 02/15/21    XR Chest 2 View    Narrative  Examination: XR CHEST 2 VW-    Date of Exam: 2/15/2021 8:43 AM    Indication: PRE OPS, JOINT PAIN; Z01.818-Encounter for other  preprocedural examination; M25.50-Pain in unspecified joint.  Preop left  knee arthroplasty. Previous smoking history.    Comparison: Radiograph 03/25/2015    Technique: 2 radiographic views of the chest were obtained.    Findings:  There are no airspace consolidations. No pleural effusions. No  pneumothorax. The heart size is normal. The pulmonary vasculature  appears within normal limits. No acute osseous abnormality identified.    Impression  No acute cardiopulmonary process.    Electronically Signed By-Carmelina Riojas MD  On:2/15/2021 9:08 AM  This report was finalized on [id number] by  Carmelina Riojas MD.       Results for orders placed during the hospital encounter of 12/01/23    CT Chest Low Dose Cancer Screening WO    Narrative  CT CHEST LOW DOSE CANCER SCREENING WO    Date of Exam: 12/1/2023 9:11 AM EST    Indication: Lung cancer screening, >= 20 pk-yr smoking history (Age >= 50y).    Comparison: CT abdomen and pelvis with contrast 6/25/2021    Technique: Low dose CT imaging of the chest was performed without intravenous contrast enhancement.  Automated exposure control and iterative reconstruction methods were used.    Findings:  Soft tissue of the lower neck are without acute abnormality. Heart size normal. Mild coronary artery calcification. No pericardial or pleural effusion. Scattered mediastinal lymph nodes below size criteria. No axillary adenopathy. Mild eventration of the  right hemidiaphragm with adjacent atelectasis of the right lower lobe and right middle lobe.    Trachea and mainstem bronchi are patent. No pneumothorax. No suspicious pulmonary nodule or mass. Benign pleural-based 4 mm nodule in the posterior right upper lobe (5/44). No findings of pneumonia.    Upper abdomen demonstrates severe hepatic steatosis. Normal left adrenal gland and spleen. There is a right adrenal gland nodule consistent with suspected myelolipoma measuring 2 cm. No aggressive osseous lesion or fracture.    Impression  Impression:  1. No suspicious pulmonary nodule.  2. Severe hepatic steatosis.  3. Coronary artery calcifications.  4. Elevation of the right diaphragm with associated right middle lobe and right basilar atelectasis.    Recommendation:  Continue annual screening with LDCT    Lung Rads Assessment:  Lung-RADS L1 - Negative, <1% chance of malignancy.        Electronically Signed: Skyler Mccray MD  12/1/2023 2:16 PM EST  Workstation ID: [id number]      Results for orders placed during the hospital encounter of 12/01/23    CT Chest  Low Dose Cancer Screening WO    Narrative  CT CHEST LOW DOSE CANCER SCREENING WO    Date of Exam: 12/1/2023 9:11 AM EST    Indication: Lung cancer screening, >= 20 pk-yr smoking history (Age >= 50y).    Comparison: CT abdomen and pelvis with contrast 6/25/2021    Technique: Low dose CT imaging of the chest was performed without intravenous contrast enhancement.  Automated exposure control and iterative reconstruction methods were used.    Findings:  Soft tissue of the lower neck are without acute abnormality. Heart size normal. Mild coronary artery calcification. No pericardial or pleural effusion. Scattered mediastinal lymph nodes below size criteria. No axillary adenopathy. Mild eventration of the  right hemidiaphragm with adjacent atelectasis of the right lower lobe and right middle lobe.    Trachea and mainstem bronchi are patent. No pneumothorax. No suspicious pulmonary nodule or mass. Benign pleural-based 4 mm nodule in the posterior right upper lobe (5/44). No findings of pneumonia.    Upper abdomen demonstrates severe hepatic steatosis. Normal left adrenal gland and spleen. There is a right adrenal gland nodule consistent with suspected myelolipoma measuring 2 cm. No aggressive osseous lesion or fracture.    Impression  Impression:  1. No suspicious pulmonary nodule.  2. Severe hepatic steatosis.  3. Coronary artery calcifications.  4. Elevation of the right diaphragm with associated right middle lobe and right basilar atelectasis.    Recommendation:  Continue annual screening with LDCT    Lung Rads Assessment:  Lung-RADS L1 - Negative, <1% chance of malignancy.        Electronically Signed: Skyler Mccray MD  12/1/2023 2:16 PM EST  Workstation ID: [id number]      Lab Review:   No visits with results within 6 Month(s) from this visit.   Latest known visit with results is:   Office Visit on 12/11/2023   Component Date Value    WBC 12/11/2023 7.82     RBC 12/11/2023 5.18     Hemoglobin 12/11/2023 15.3      Hematocrit 12/11/2023 47.7     MCV 12/11/2023 92.1     MCH 12/11/2023 29.5     MCHC 12/11/2023 32.1     RDW 12/11/2023 12.5     RDW-SD 12/11/2023 41.3     MPV 12/11/2023 9.6     Platelets 12/11/2023 309     Glucose 12/11/2023 141 (H)     BUN 12/11/2023 15     Creatinine 12/11/2023 0.89     Sodium 12/11/2023 142     Potassium 12/11/2023 4.0     Chloride 12/11/2023 99     CO2 12/11/2023 28.3     Calcium 12/11/2023 9.8     Total Protein 12/11/2023 7.6     Albumin 12/11/2023 4.6     ALT (SGPT) 12/11/2023 90 (H)     AST (SGOT) 12/11/2023 40     Alkaline Phosphatase 12/11/2023 90     Total Bilirubin 12/11/2023 0.3     Globulin 12/11/2023 3.0     A/G Ratio 12/11/2023 1.5     BUN/Creatinine Ratio 12/11/2023 16.9     Anion Gap 12/11/2023 14.7     eGFR 12/11/2023 99.3     Hemoglobin A1C 12/11/2023 6.20 (H)     Total Cholesterol 12/11/2023 222 (H)     Triglycerides 12/11/2023 184 (H)     HDL Cholesterol 12/11/2023 43     LDL Cholesterol  12/11/2023 146 (H)     VLDL Cholesterol 12/11/2023 33     LDL/HDL Ratio 12/11/2023 3.31     PSA 12/11/2023 1.570     Uric Acid 12/11/2023 9.6 (H)      Recent labs reviewed and interpreted for clinical significance and application            Level of Care:           Alpesh Rock MD  03/12/25  .

## 2025-03-31 ENCOUNTER — TELEPHONE (OUTPATIENT)
Dept: FAMILY MEDICINE CLINIC | Facility: CLINIC | Age: 60
End: 2025-03-31
Payer: COMMERCIAL

## 2025-03-31 DIAGNOSIS — E66.9 OBESITY (BMI 30-39.9): ICD-10-CM

## 2025-03-31 DIAGNOSIS — I10 ESSENTIAL HYPERTENSION: ICD-10-CM

## 2025-03-31 DIAGNOSIS — I25.10 NONOBSTRUCTIVE ATHEROSCLEROSIS OF CORONARY ARTERY: ICD-10-CM

## 2025-03-31 NOTE — TELEPHONE ENCOUNTER
"    Caller: Titus Jarvis \"Bg\"    Relationship: Self    Best call back number: [phone]    Which medication are you concerned about: SEMAGLUTIDE    What are your concerns: CAN PATIENT STAY ON THE 25 UNITS OF SEMIGLUTIDE? PATIENT STATES THAT IF THIS CAN STAY AT 25, PLEASE LET [facility] KNOW THIS. THEY HAVE 50 MG UNIT IN SYSTEM.       "

## 2025-04-05 ENCOUNTER — HOSPITAL ENCOUNTER (OUTPATIENT)
Dept: CT IMAGING | Facility: HOSPITAL | Age: 60
Discharge: HOME | End: 2025-04-05
Payer: COMMERCIAL

## 2025-04-05 DIAGNOSIS — Z87.891 SMOKING HISTORY: ICD-10-CM

## 2025-04-05 PROCEDURE — 71271 CT THORAX LUNG CANCER SCR C-: CPT

## 2025-05-08 ENCOUNTER — HOSPITAL ENCOUNTER (OUTPATIENT)
Dept: CARDIOLOGY | Facility: HOSPITAL | Age: 60
Discharge: HOME | End: 2025-05-08
Payer: COMMERCIAL

## 2025-05-08 ENCOUNTER — TRANSCRIBE ORDERS (OUTPATIENT)
Dept: ADMINISTRATIVE | Facility: HOSPITAL | Age: 60
End: 2025-05-08
Payer: COMMERCIAL

## 2025-05-08 ENCOUNTER — LAB (OUTPATIENT)
Dept: LAB | Facility: HOSPITAL | Age: 60
End: 2025-05-08
Payer: COMMERCIAL

## 2025-05-08 DIAGNOSIS — Z01.818 PRE-OP TESTING: Primary | ICD-10-CM

## 2025-05-08 DIAGNOSIS — Z01.818 PRE-OP TESTING: ICD-10-CM

## 2025-05-08 LAB
BILIRUB UR QL STRIP: NEGATIVE
CLARITY UR: CLEAR
COLOR UR: YELLOW
GLUCOSE UR STRIP-MCNC: NEGATIVE MG/DL
HGB UR QL STRIP.AUTO: NEGATIVE
HOLD SPECIMEN: NORMAL
INR PPP: 0.99 (ref 0.9–1.1)
KETONES UR QL STRIP: ABNORMAL
LEUKOCYTE ESTERASE UR QL STRIP.AUTO: NEGATIVE
NITRITE UR QL STRIP: NEGATIVE
PH UR STRIP.AUTO: 5.5 [PH] (ref 5–8)
PROT UR QL STRIP: ABNORMAL
PROTHROMBIN TIME: 13 SECONDS (ref 11.7–14.2)
SP GR UR STRIP: 1.03 (ref 1–1.03)
UROBILINOGEN UR QL STRIP: ABNORMAL

## 2025-05-08 PROCEDURE — 36415 COLL VENOUS BLD VENIPUNCTURE: CPT

## 2025-05-08 PROCEDURE — 85025 COMPLETE CBC W/AUTO DIFF WBC: CPT

## 2025-05-08 PROCEDURE — 85610 PROTHROMBIN TIME: CPT

## 2025-05-08 PROCEDURE — 81003 URINALYSIS AUTO W/O SCOPE: CPT

## 2025-05-08 PROCEDURE — 93005 ELECTROCARDIOGRAM TRACING: CPT | Performed by: ORTHOPAEDIC SURGERY

## 2025-05-09 LAB
BASOPHILS # BLD AUTO: 0.03 10*3/MM3 (ref 0–0.2)
BASOPHILS NFR BLD AUTO: 0.3 % (ref 0–1.5)
DEPRECATED RDW RBC AUTO: 46.5 FL (ref 37–54)
EOSINOPHIL # BLD AUTO: 0.12 10*3/MM3 (ref 0–0.4)
EOSINOPHIL NFR BLD AUTO: 1.1 % (ref 0.3–6.2)
ERYTHROCYTE [DISTWIDTH] IN BLOOD BY AUTOMATED COUNT: 13.3 % (ref 12.3–15.4)
HCT VFR BLD AUTO: 43.4 % (ref 37.5–51)
HGB BLD-MCNC: 14.3 G/DL (ref 13–17.7)
IMM GRANULOCYTES # BLD AUTO: 0.11 10*3/MM3 (ref 0–0.05)
IMM GRANULOCYTES NFR BLD AUTO: 1 % (ref 0–0.5)
LYMPHOCYTES # BLD AUTO: 2.89 10*3/MM3 (ref 0.7–3.1)
LYMPHOCYTES NFR BLD AUTO: 27 % (ref 19.6–45.3)
MCH RBC QN AUTO: 31.5 PG (ref 26.6–33)
MCHC RBC AUTO-ENTMCNC: 32.9 G/DL (ref 31.5–35.7)
MCV RBC AUTO: 95.6 FL (ref 79–97)
MONOCYTES # BLD AUTO: 0.85 10*3/MM3 (ref 0.1–0.9)
MONOCYTES NFR BLD AUTO: 7.9 % (ref 5–12)
NEUTROPHILS NFR BLD AUTO: 6.71 10*3/MM3 (ref 1.7–7)
NEUTROPHILS NFR BLD AUTO: 62.7 % (ref 42.7–76)
NRBC BLD AUTO-RTO: 0 /100 WBC (ref 0–0.2)
PLATELET # BLD AUTO: 364 10*3/MM3 (ref 140–450)
PMV BLD AUTO: 9.4 FL (ref 6–12)
QT INTERVAL: 389 MS
QTC INTERVAL: 430 MS
RBC # BLD AUTO: 4.54 10*6/MM3 (ref 4.14–5.8)
WBC NRBC COR # BLD AUTO: 10.71 10*3/MM3 (ref 3.4–10.8)

## 2025-05-12 ENCOUNTER — LAB (OUTPATIENT)
Dept: LAB | Facility: HOSPITAL | Age: 60
End: 2025-05-12
Payer: COMMERCIAL

## 2025-05-12 DIAGNOSIS — Z01.818 PRE-OP TESTING: ICD-10-CM

## 2025-05-12 LAB
ALBUMIN SERPL-MCNC: 4.5 G/DL (ref 3.5–5.2)
ALBUMIN/GLOB SERPL: 1.6 G/DL
ALP SERPL-CCNC: 119 U/L (ref 39–117)
ALT SERPL W P-5'-P-CCNC: 45 U/L (ref 1–41)
ANION GAP SERPL CALCULATED.3IONS-SCNC: 13.7 MMOL/L (ref 5–15)
AST SERPL-CCNC: 26 U/L (ref 1–40)
BILIRUB SERPL-MCNC: 0.3 MG/DL (ref 0–1.2)
BUN SERPL-MCNC: 13 MG/DL (ref 6–20)
BUN/CREAT SERPL: 12.4 (ref 7–25)
CALCIUM SPEC-SCNC: 10.3 MG/DL (ref 8.6–10.5)
CHLORIDE SERPL-SCNC: 104 MMOL/L (ref 98–107)
CO2 SERPL-SCNC: 27.3 MMOL/L (ref 22–29)
CREAT SERPL-MCNC: 1.05 MG/DL (ref 0.76–1.27)
EGFRCR SERPLBLD CKD-EPI 2021: 81.8 ML/MIN/1.73
GLOBULIN UR ELPH-MCNC: 2.8 GM/DL
GLUCOSE SERPL-MCNC: 99 MG/DL (ref 65–99)
POTASSIUM SERPL-SCNC: 4 MMOL/L (ref 3.5–5.2)
PROT SERPL-MCNC: 7.3 G/DL (ref 6–8.5)
SODIUM SERPL-SCNC: 145 MMOL/L (ref 136–145)

## 2025-05-12 PROCEDURE — 36415 COLL VENOUS BLD VENIPUNCTURE: CPT

## 2025-05-12 PROCEDURE — 80053 COMPREHEN METABOLIC PANEL: CPT

## 2025-05-13 LAB
QT INTERVAL: 389 MS
QTC INTERVAL: 430 MS

## 2025-07-09 ENCOUNTER — OFFICE VISIT (OUTPATIENT)
Dept: FAMILY MEDICINE CLINIC | Facility: CLINIC | Age: 60
End: 2025-07-09
Payer: COMMERCIAL

## 2025-07-09 ENCOUNTER — LAB (OUTPATIENT)
Dept: FAMILY MEDICINE CLINIC | Facility: CLINIC | Age: 60
End: 2025-07-09
Payer: COMMERCIAL

## 2025-07-09 VITALS
DIASTOLIC BLOOD PRESSURE: 73 MMHG | WEIGHT: 197.6 LBS | OXYGEN SATURATION: 94 % | BODY MASS INDEX: 29.27 KG/M2 | HEIGHT: 69 IN | RESPIRATION RATE: 18 BRPM | HEART RATE: 78 BPM | SYSTOLIC BLOOD PRESSURE: 142 MMHG

## 2025-07-09 DIAGNOSIS — E78.2 MIXED HYPERLIPIDEMIA: ICD-10-CM

## 2025-07-09 DIAGNOSIS — Z12.5 PROSTATE CANCER SCREENING: ICD-10-CM

## 2025-07-09 DIAGNOSIS — Z00.00 ANNUAL PHYSICAL EXAM: Primary | ICD-10-CM

## 2025-07-09 DIAGNOSIS — I10 ESSENTIAL HYPERTENSION: ICD-10-CM

## 2025-07-09 DIAGNOSIS — M1A.0720 CHRONIC GOUT OF LEFT FOOT, UNSPECIFIED CAUSE: ICD-10-CM

## 2025-07-09 LAB
ALBUMIN SERPL-MCNC: 4.3 G/DL (ref 3.5–5.2)
ALBUMIN/GLOB SERPL: 1.7 G/DL
ALP SERPL-CCNC: 87 U/L (ref 39–117)
ALT SERPL W P-5'-P-CCNC: 33 U/L (ref 1–41)
ANION GAP SERPL CALCULATED.3IONS-SCNC: 12 MMOL/L (ref 5–15)
AST SERPL-CCNC: 24 U/L (ref 1–40)
BILIRUB SERPL-MCNC: 0.4 MG/DL (ref 0–1.2)
BUN SERPL-MCNC: 11 MG/DL (ref 8–23)
BUN/CREAT SERPL: 14.1 (ref 7–25)
CALCIUM SPEC-SCNC: 9.6 MG/DL (ref 8.6–10.5)
CHLORIDE SERPL-SCNC: 104 MMOL/L (ref 98–107)
CHOLEST SERPL-MCNC: 137 MG/DL (ref 0–200)
CO2 SERPL-SCNC: 27 MMOL/L (ref 22–29)
CREAT SERPL-MCNC: 0.78 MG/DL (ref 0.76–1.27)
EGFRCR SERPLBLD CKD-EPI 2021: 102.1 ML/MIN/1.73
GLOBULIN UR ELPH-MCNC: 2.6 GM/DL
GLUCOSE SERPL-MCNC: 84 MG/DL (ref 65–99)
HBA1C MFR BLD: 5.4 % (ref 4.8–5.6)
HDLC SERPL-MCNC: 61 MG/DL (ref 40–60)
LDLC SERPL CALC-MCNC: 57 MG/DL (ref 0–100)
LDLC/HDLC SERPL: 0.9 {RATIO}
POTASSIUM SERPL-SCNC: 4.1 MMOL/L (ref 3.5–5.2)
PROT SERPL-MCNC: 6.9 G/DL (ref 6–8.5)
PSA SERPL-MCNC: 2.38 NG/ML (ref 0–4)
SODIUM SERPL-SCNC: 143 MMOL/L (ref 136–145)
TRIGL SERPL-MCNC: 107 MG/DL (ref 0–150)
TSH SERPL DL<=0.05 MIU/L-ACNC: 1.7 UIU/ML (ref 0.27–4.2)
URATE SERPL-MCNC: 7.9 MG/DL (ref 3.4–7)
VLDLC SERPL-MCNC: 19 MG/DL (ref 5–40)

## 2025-07-09 PROCEDURE — G0103 PSA SCREENING: HCPCS | Performed by: STUDENT IN AN ORGANIZED HEALTH CARE EDUCATION/TRAINING PROGRAM

## 2025-07-09 PROCEDURE — 99396 PREV VISIT EST AGE 40-64: CPT | Performed by: STUDENT IN AN ORGANIZED HEALTH CARE EDUCATION/TRAINING PROGRAM

## 2025-07-09 PROCEDURE — 84443 ASSAY THYROID STIM HORMONE: CPT | Performed by: STUDENT IN AN ORGANIZED HEALTH CARE EDUCATION/TRAINING PROGRAM

## 2025-07-09 PROCEDURE — 83036 HEMOGLOBIN GLYCOSYLATED A1C: CPT | Performed by: STUDENT IN AN ORGANIZED HEALTH CARE EDUCATION/TRAINING PROGRAM

## 2025-07-09 PROCEDURE — 80053 COMPREHEN METABOLIC PANEL: CPT | Performed by: STUDENT IN AN ORGANIZED HEALTH CARE EDUCATION/TRAINING PROGRAM

## 2025-07-09 PROCEDURE — 36415 COLL VENOUS BLD VENIPUNCTURE: CPT | Performed by: STUDENT IN AN ORGANIZED HEALTH CARE EDUCATION/TRAINING PROGRAM

## 2025-07-09 PROCEDURE — 84550 ASSAY OF BLOOD/URIC ACID: CPT | Performed by: STUDENT IN AN ORGANIZED HEALTH CARE EDUCATION/TRAINING PROGRAM

## 2025-07-09 PROCEDURE — 80061 LIPID PANEL: CPT | Performed by: STUDENT IN AN ORGANIZED HEALTH CARE EDUCATION/TRAINING PROGRAM

## 2025-07-09 NOTE — PROGRESS NOTES
"Chief Complaint  Chief Complaint   Patient presents with    Annual Exam     Subjective        Tiuts Jarvis is a 60 y.o. male who presents to [facility].    History of Present Illness  Here for annual physical.    Diet: decent intake of fruits and vegetables.  Has cut out bread.  Good intake of protein.  Drinks plenty o fwater.    Exercise: walking, recently had arthroscopic L knee surgery   Sleep: no concerns, 8 hrs / night.     Takes allopurinol as needed.  Has not had a flare up in 1 yr.      Objective   /73   Pulse 78   Resp 18   Ht 175.3 cm (69\")   Wt 89.6 kg (197 lb 9.6 oz)   SpO2 94%   BMI 29.18 kg/m²     Estimated body mass index is 29.18 kg/m² as calculated from the following:    Height as of this encounter: 175.3 cm (69\").    Weight as of this encounter: 89.6 kg (197 lb 9.6 oz).     Physical Exam   GEN: In no acute distress, non toxic appearing  HEENT: Pupils equal and reactive to light, sclera clear. Mucous membranes moist. Oropharynx without erythema or exudate. No cervical or submandibular lymphadenopathy.  Bilateral TM's wnl.    CV: Regular rate and rhythm, no murmurs, 2+ peripheral pulses, No extremity edema.   RESP: Lungs clear to auscultation anteriorly and posteriorly in all lung fields bilaterally.  NEURO: AAO to person, place, and time. CN 2-12 intact grossly.    PSYCH: Affect normal, insight fair      Result Review :              Assessment and Plan     Diagnoses and all orders for this visit:    1. Annual physical exam (Primary)  Overall reassuring exam.  BP at goal today.  Check blood work as below.  UTD on colonoscopy.  PSA today.  Continue healthy low carb diet.  Drink plenty of water.  Encourage regular physical activity.  Next physical in 1 yr, f/u sooner prn.    -     Comprehensive Metabolic Panel  -     Hemoglobin A1c  -     Lipid Panel  -     TSH    2. Essential hypertension  Continue amlodipine 5 mg daily, lisinopril 20 mg daily, " metoprolol succinate 100 mg daily.    3. Mixed hyperlipidemia  Continue atorvastatin 10 mg daily, check lipids today.    Orders:  -     Lipid Panel    4. Prostate cancer screening  -     PSA Screen    5. Chronic gout of left foot, unspecified cause  Previously uric acid level was 9.6.  Takes allopurinol 100 mg prn.  Recheck uric acid levels today.    -     Uric Acid       Follow Up     Return in about 1 year (around 7/9/2026) for Annual physical.

## 2025-07-21 RX ORDER — LISINOPRIL 20 MG/1
20 TABLET ORAL DAILY
Qty: 90 TABLET | Refills: 1 | Status: SHIPPED | OUTPATIENT
Start: 2025-07-21

## 2025-07-21 RX ORDER — METOPROLOL SUCCINATE 100 MG/1
100 TABLET, EXTENDED RELEASE ORAL DAILY
Qty: 90 TABLET | Refills: 1 | Status: SHIPPED | OUTPATIENT
Start: 2025-07-21

## 2025-07-22 DIAGNOSIS — M1A.0720 CHRONIC GOUT OF LEFT FOOT, UNSPECIFIED CAUSE: ICD-10-CM

## 2025-07-22 RX ORDER — ALLOPURINOL 100 MG/1
100 TABLET ORAL DAILY
Qty: 90 TABLET | Refills: 1 | Status: SHIPPED | OUTPATIENT
Start: 2025-07-22

## 2025-07-22 RX ORDER — COLCHICINE 0.6 MG/1
TABLET ORAL
Qty: 20 TABLET | Refills: 0 | Status: SHIPPED | OUTPATIENT
Start: 2025-07-22

## 2025-07-22 NOTE — TELEPHONE ENCOUNTER
"  Caller: MatheusTitus \"Bg\"    Relationship: Self    Best call back number:     Requested Prescriptions:   Requested Prescriptions     Pending Prescriptions Disp Refills    colchicine 0.6 MG tablet 20 tablet 0     Sig: Take 2 on day 1 of flare, then 1 a day until symptoms improve    allopurinol (Zyloprim) 100 MG tablet 90 tablet 1     Sig: Take 1 tablet by mouth Daily.        Pharmacy where request should be sent: [id number] [address] AT [facility] [phone] [facility] [phone]      Last office visit with prescribing clinician: 7/9/2025   Last telemedicine visit with prescribing clinician: Visit date not found   Next office visit with prescribing clinician: 7/13/2026       Does the patient have less than a 3 day supply:  [x] Yes  [] No    Would you like a call back once the refill request has been completed: [] Yes [x] No    If the office needs to give you a call back, can they leave a voicemail: [] Yes [x] No    Shannon Salter Rep   07/22/25 15:17 EDT             "

## (undated) DEVICE — DRAPE, RADIAL, STERILE: Brand: MEDLINE

## (undated) DEVICE — BND COMPR ZEPHYR VASC LG

## (undated) DEVICE — GW EMR FIX EXCHG J STD .035 3MM 260CM

## (undated) DEVICE — GLIDESHEATH SLENDER STAINLESS STEEL KIT: Brand: GLIDESHEATH SLENDER

## (undated) DEVICE — CATH DIAG IMPULSE FL3 5F 100CM

## (undated) DEVICE — CATH DIAG IMPULSE FR4 5F 100CM

## (undated) DEVICE — PK TRY HEART CATH 50